# Patient Record
Sex: FEMALE | Race: WHITE | Employment: UNEMPLOYED
[De-identification: names, ages, dates, MRNs, and addresses within clinical notes are randomized per-mention and may not be internally consistent; named-entity substitution may affect disease eponyms.]

---

## 2017-01-17 ENCOUNTER — PROCEDURE VISIT (OUTPATIENT)
Dept: PODIATRY | Facility: CLINIC | Age: 82
End: 2017-01-17

## 2017-01-17 VITALS
DIASTOLIC BLOOD PRESSURE: 54 MMHG | HEART RATE: 63 BPM | HEIGHT: 60 IN | SYSTOLIC BLOOD PRESSURE: 96 MMHG | BODY MASS INDEX: 34.75 KG/M2 | WEIGHT: 177 LBS

## 2017-01-17 DIAGNOSIS — M79.674 PAIN IN TOES OF BOTH FEET: ICD-10-CM

## 2017-01-17 DIAGNOSIS — B35.1 DERMATOPHYTOSIS OF NAIL: Primary | ICD-10-CM

## 2017-01-17 DIAGNOSIS — M79.675 PAIN IN TOES OF BOTH FEET: ICD-10-CM

## 2017-01-17 PROCEDURE — 1036F TOBACCO NON-USER: CPT | Performed by: PODIATRIST

## 2017-01-17 PROCEDURE — 11721 DEBRIDE NAIL 6 OR MORE: CPT | Performed by: PODIATRIST

## 2017-01-17 RX ORDER — TAMSULOSIN HYDROCHLORIDE 0.4 MG/1
0.4 CAPSULE ORAL
COMMUNITY

## 2017-01-17 ASSESSMENT — ENCOUNTER SYMPTOMS
COLOR CHANGE: 0
DIARRHEA: 0
VOMITING: 0
NAUSEA: 0
CONSTIPATION: 0

## 2017-02-07 ENCOUNTER — HOSPITAL ENCOUNTER (OUTPATIENT)
Dept: PHYSICAL THERAPY | Age: 82
Setting detail: THERAPIES SERIES
Discharge: HOME | End: 2017-02-07
Payer: MEDICARE

## 2017-02-07 PROCEDURE — 97113 AQUATIC THERAPY/EXERCISES: CPT

## 2017-02-10 ENCOUNTER — HOSPITAL ENCOUNTER (OUTPATIENT)
Dept: PHYSICAL THERAPY | Age: 82
Setting detail: THERAPIES SERIES
Discharge: HOME | End: 2017-02-10
Payer: MEDICARE

## 2017-02-10 PROCEDURE — 97113 AQUATIC THERAPY/EXERCISES: CPT

## 2017-02-10 ASSESSMENT — PAIN DESCRIPTION - ORIENTATION: ORIENTATION: LOWER

## 2017-02-10 ASSESSMENT — PAIN DESCRIPTION - PAIN TYPE: TYPE: CHRONIC PAIN

## 2017-02-10 ASSESSMENT — PAIN DESCRIPTION - LOCATION: LOCATION: BACK

## 2017-02-10 ASSESSMENT — PAIN SCALES - GENERAL: PAINLEVEL_OUTOF10: 4

## 2017-02-14 ENCOUNTER — HOSPITAL ENCOUNTER (OUTPATIENT)
Dept: PHYSICAL THERAPY | Age: 82
Setting detail: THERAPIES SERIES
Discharge: HOME | End: 2017-02-14
Payer: MEDICARE

## 2017-02-14 PROCEDURE — G8978 MOBILITY CURRENT STATUS: HCPCS

## 2017-02-14 PROCEDURE — G8979 MOBILITY GOAL STATUS: HCPCS

## 2017-02-14 PROCEDURE — 97113 AQUATIC THERAPY/EXERCISES: CPT

## 2017-02-14 PROCEDURE — 97164 PT RE-EVAL EST PLAN CARE: CPT

## 2017-02-14 ASSESSMENT — PAIN SCALES - GENERAL
PAINLEVEL_OUTOF10: 3
PAINLEVEL_OUTOF10: 3

## 2017-02-14 ASSESSMENT — PAIN DESCRIPTION - ORIENTATION
ORIENTATION: LOWER
ORIENTATION: LOWER

## 2017-02-14 ASSESSMENT — PAIN DESCRIPTION - PAIN TYPE
TYPE: CHRONIC PAIN
TYPE: CHRONIC PAIN

## 2017-02-14 ASSESSMENT — PAIN DESCRIPTION - LOCATION
LOCATION: BACK
LOCATION: BACK

## 2017-02-16 ENCOUNTER — HOSPITAL ENCOUNTER (OUTPATIENT)
Dept: PHYSICAL THERAPY | Age: 82
Setting detail: THERAPIES SERIES
Discharge: HOME | End: 2017-02-16
Payer: MEDICARE

## 2017-02-16 PROCEDURE — 97113 AQUATIC THERAPY/EXERCISES: CPT

## 2017-02-16 ASSESSMENT — PAIN SCALES - GENERAL: PAINLEVEL_OUTOF10: 1

## 2017-02-16 ASSESSMENT — PAIN DESCRIPTION - ORIENTATION: ORIENTATION: LOWER

## 2017-02-16 ASSESSMENT — PAIN DESCRIPTION - LOCATION: LOCATION: BACK

## 2017-02-16 ASSESSMENT — PAIN DESCRIPTION - PAIN TYPE: TYPE: CHRONIC PAIN

## 2017-02-21 ENCOUNTER — HOSPITAL ENCOUNTER (OUTPATIENT)
Dept: PHYSICAL THERAPY | Age: 82
Setting detail: THERAPIES SERIES
Discharge: HOME | End: 2017-02-21
Payer: MEDICARE

## 2017-02-21 PROCEDURE — 97113 AQUATIC THERAPY/EXERCISES: CPT

## 2017-02-21 ASSESSMENT — PAIN DESCRIPTION - LOCATION: LOCATION: BACK

## 2017-02-21 ASSESSMENT — PAIN DESCRIPTION - ORIENTATION: ORIENTATION: LOWER;OTHER (COMMENT)

## 2017-02-21 ASSESSMENT — PAIN SCALES - GENERAL: PAINLEVEL_OUTOF10: 2

## 2017-02-21 ASSESSMENT — PAIN DESCRIPTION - PAIN TYPE: TYPE: CHRONIC PAIN

## 2017-02-23 ENCOUNTER — APPOINTMENT (OUTPATIENT)
Dept: PHYSICAL THERAPY | Age: 82
End: 2017-02-23
Payer: MEDICARE

## 2017-02-28 ENCOUNTER — HOSPITAL ENCOUNTER (OUTPATIENT)
Dept: PHYSICAL THERAPY | Age: 82
Setting detail: THERAPIES SERIES
Discharge: HOME | End: 2017-02-28
Payer: MEDICARE

## 2017-02-28 PROCEDURE — 97113 AQUATIC THERAPY/EXERCISES: CPT

## 2017-02-28 ASSESSMENT — PAIN DESCRIPTION - PAIN TYPE: TYPE: CHRONIC PAIN

## 2017-02-28 ASSESSMENT — PAIN SCALES - GENERAL: PAINLEVEL_OUTOF10: 3

## 2017-02-28 ASSESSMENT — PAIN DESCRIPTION - ORIENTATION: ORIENTATION: LOWER

## 2017-02-28 ASSESSMENT — PAIN DESCRIPTION - LOCATION: LOCATION: BACK

## 2017-03-02 ENCOUNTER — HOSPITAL ENCOUNTER (OUTPATIENT)
Dept: PHYSICAL THERAPY | Age: 82
Setting detail: THERAPIES SERIES
Discharge: HOME | End: 2017-03-02
Payer: MEDICARE

## 2017-03-02 PROCEDURE — 97113 AQUATIC THERAPY/EXERCISES: CPT

## 2017-03-02 ASSESSMENT — PAIN SCALES - GENERAL: PAINLEVEL_OUTOF10: 1

## 2017-03-02 ASSESSMENT — PAIN DESCRIPTION - PAIN TYPE: TYPE: CHRONIC PAIN

## 2017-03-02 ASSESSMENT — PAIN DESCRIPTION - LOCATION: LOCATION: BACK

## 2017-03-02 ASSESSMENT — PAIN DESCRIPTION - ORIENTATION: ORIENTATION: LOWER

## 2017-03-07 ENCOUNTER — HOSPITAL ENCOUNTER (OUTPATIENT)
Dept: PHYSICAL THERAPY | Age: 82
Setting detail: THERAPIES SERIES
Discharge: HOME | End: 2017-03-07
Payer: MEDICARE

## 2017-03-07 PROCEDURE — G8978 MOBILITY CURRENT STATUS: HCPCS

## 2017-03-07 PROCEDURE — G8979 MOBILITY GOAL STATUS: HCPCS

## 2017-03-07 PROCEDURE — 97113 AQUATIC THERAPY/EXERCISES: CPT

## 2017-03-09 ENCOUNTER — HOSPITAL ENCOUNTER (OUTPATIENT)
Dept: PHYSICAL THERAPY | Age: 82
Setting detail: THERAPIES SERIES
Discharge: HOME | End: 2017-03-09
Payer: MEDICARE

## 2017-03-09 PROCEDURE — 97113 AQUATIC THERAPY/EXERCISES: CPT

## 2017-03-14 ENCOUNTER — HOSPITAL ENCOUNTER (OUTPATIENT)
Dept: PHYSICAL THERAPY | Age: 82
Setting detail: THERAPIES SERIES
Discharge: HOME | End: 2017-03-14
Payer: MEDICARE

## 2017-03-14 ENCOUNTER — APPOINTMENT (OUTPATIENT)
Dept: PHYSICAL THERAPY | Age: 82
End: 2017-03-14
Payer: MEDICARE

## 2017-03-14 PROCEDURE — 97110 THERAPEUTIC EXERCISES: CPT

## 2017-03-16 ENCOUNTER — HOSPITAL ENCOUNTER (OUTPATIENT)
Dept: PHYSICAL THERAPY | Age: 82
Setting detail: THERAPIES SERIES
Discharge: HOME | End: 2017-03-16
Payer: MEDICARE

## 2017-03-16 PROCEDURE — 97110 THERAPEUTIC EXERCISES: CPT

## 2017-03-22 ENCOUNTER — HOSPITAL ENCOUNTER (OUTPATIENT)
Dept: PHYSICAL THERAPY | Age: 82
Setting detail: THERAPIES SERIES
Discharge: HOME | End: 2017-03-22
Payer: MEDICARE

## 2017-03-22 PROCEDURE — 97110 THERAPEUTIC EXERCISES: CPT

## 2017-03-22 ASSESSMENT — PAIN SCALES - GENERAL: PAINLEVEL_OUTOF10: 5

## 2017-03-22 ASSESSMENT — PAIN DESCRIPTION - LOCATION: LOCATION: BACK

## 2017-03-22 ASSESSMENT — PAIN DESCRIPTION - ORIENTATION: ORIENTATION: LOWER

## 2017-03-22 ASSESSMENT — PAIN DESCRIPTION - FREQUENCY: FREQUENCY: INTERMITTENT

## 2017-03-22 ASSESSMENT — PAIN DESCRIPTION - DESCRIPTORS: DESCRIPTORS: ACHING;DULL

## 2017-04-03 ENCOUNTER — HOSPITAL ENCOUNTER (OUTPATIENT)
Dept: PHYSICAL THERAPY | Age: 82
Setting detail: THERAPIES SERIES
Discharge: HOME | End: 2017-04-03
Payer: MEDICARE

## 2017-04-03 PROCEDURE — 97110 THERAPEUTIC EXERCISES: CPT

## 2017-04-03 ASSESSMENT — PAIN DESCRIPTION - FREQUENCY: FREQUENCY: INTERMITTENT

## 2017-04-03 ASSESSMENT — PAIN SCALES - GENERAL: PAINLEVEL_OUTOF10: 5

## 2017-04-03 ASSESSMENT — PAIN DESCRIPTION - LOCATION: LOCATION: BACK

## 2017-04-03 ASSESSMENT — PAIN DESCRIPTION - ORIENTATION: ORIENTATION: LOWER

## 2017-04-03 ASSESSMENT — PAIN DESCRIPTION - DESCRIPTORS: DESCRIPTORS: ACHING;DULL

## 2017-04-10 ENCOUNTER — HOSPITAL ENCOUNTER (OUTPATIENT)
Dept: PHYSICAL THERAPY | Age: 82
Setting detail: THERAPIES SERIES
Discharge: HOME | End: 2017-04-10
Payer: MEDICARE

## 2017-04-10 PROCEDURE — 97110 THERAPEUTIC EXERCISES: CPT

## 2017-04-10 ASSESSMENT — PAIN DESCRIPTION - LOCATION: LOCATION: BACK

## 2017-04-10 ASSESSMENT — PAIN DESCRIPTION - DESCRIPTORS: DESCRIPTORS: ACHING;DULL

## 2017-04-10 ASSESSMENT — PAIN SCALES - GENERAL: PAINLEVEL_OUTOF10: 3

## 2017-04-10 ASSESSMENT — PAIN DESCRIPTION - ORIENTATION: ORIENTATION: LOWER

## 2017-04-10 ASSESSMENT — PAIN DESCRIPTION - FREQUENCY: FREQUENCY: INTERMITTENT

## 2017-04-11 ENCOUNTER — PROCEDURE VISIT (OUTPATIENT)
Dept: PODIATRY | Age: 82
End: 2017-04-11
Payer: MEDICARE

## 2017-04-11 VITALS
HEART RATE: 66 BPM | DIASTOLIC BLOOD PRESSURE: 77 MMHG | BODY MASS INDEX: 34.75 KG/M2 | HEIGHT: 60 IN | WEIGHT: 177 LBS | SYSTOLIC BLOOD PRESSURE: 126 MMHG

## 2017-04-11 DIAGNOSIS — B35.1 DERMATOPHYTOSIS OF NAIL: Primary | ICD-10-CM

## 2017-04-11 DIAGNOSIS — M79.674 PAIN IN TOES OF BOTH FEET: ICD-10-CM

## 2017-04-11 DIAGNOSIS — M79.675 PAIN IN TOES OF BOTH FEET: ICD-10-CM

## 2017-04-11 PROCEDURE — 1036F TOBACCO NON-USER: CPT | Performed by: PODIATRIST

## 2017-04-11 PROCEDURE — 11721 DEBRIDE NAIL 6 OR MORE: CPT | Performed by: PODIATRIST

## 2017-04-11 ASSESSMENT — ENCOUNTER SYMPTOMS
CONSTIPATION: 0
NAUSEA: 0
VOMITING: 0
COLOR CHANGE: 0
DIARRHEA: 0

## 2017-04-17 ENCOUNTER — HOSPITAL ENCOUNTER (OUTPATIENT)
Dept: PHYSICAL THERAPY | Age: 82
Setting detail: THERAPIES SERIES
Discharge: HOME | End: 2017-04-17
Payer: MEDICARE

## 2017-04-17 PROCEDURE — 97110 THERAPEUTIC EXERCISES: CPT

## 2017-04-17 ASSESSMENT — PAIN SCALES - GENERAL: PAINLEVEL_OUTOF10: 5

## 2017-04-17 ASSESSMENT — PAIN DESCRIPTION - LOCATION: LOCATION: BACK

## 2017-04-17 ASSESSMENT — PAIN DESCRIPTION - DESCRIPTORS: DESCRIPTORS: ACHING;DULL

## 2017-04-17 ASSESSMENT — PAIN DESCRIPTION - FREQUENCY: FREQUENCY: CONTINUOUS

## 2017-04-17 ASSESSMENT — PAIN DESCRIPTION - ORIENTATION: ORIENTATION: LOWER

## 2017-04-20 ENCOUNTER — HOSPITAL ENCOUNTER (OUTPATIENT)
Dept: PHYSICAL THERAPY | Age: 82
Setting detail: THERAPIES SERIES
Discharge: HOME | End: 2017-04-20
Payer: MEDICARE

## 2017-05-01 ENCOUNTER — HOSPITAL ENCOUNTER (OUTPATIENT)
Dept: PHYSICAL THERAPY | Age: 82
Setting detail: THERAPIES SERIES
Discharge: HOME | End: 2017-05-01
Payer: MEDICARE

## 2017-05-01 PROCEDURE — G8979 MOBILITY GOAL STATUS: HCPCS

## 2017-05-01 PROCEDURE — G8978 MOBILITY CURRENT STATUS: HCPCS

## 2017-05-01 PROCEDURE — 97110 THERAPEUTIC EXERCISES: CPT

## 2017-05-01 ASSESSMENT — PAIN DESCRIPTION - ORIENTATION: ORIENTATION: RIGHT

## 2017-05-01 ASSESSMENT — PAIN SCALES - GENERAL: PAINLEVEL_OUTOF10: 5

## 2017-05-01 ASSESSMENT — PAIN DESCRIPTION - FREQUENCY: FREQUENCY: INTERMITTENT

## 2017-05-01 ASSESSMENT — PAIN DESCRIPTION - LOCATION: LOCATION: LEG

## 2017-05-01 ASSESSMENT — PAIN DESCRIPTION - DESCRIPTORS: DESCRIPTORS: CRAMPING

## 2017-05-09 ENCOUNTER — HOSPITAL ENCOUNTER (OUTPATIENT)
Dept: PHYSICAL THERAPY | Age: 82
Setting detail: THERAPIES SERIES
Discharge: HOME | End: 2017-05-09
Payer: MEDICARE

## 2017-05-16 ENCOUNTER — APPOINTMENT (OUTPATIENT)
Dept: PHYSICAL THERAPY | Age: 82
End: 2017-05-16
Payer: MEDICARE

## 2017-05-23 ENCOUNTER — APPOINTMENT (OUTPATIENT)
Dept: PHYSICAL THERAPY | Age: 82
End: 2017-05-23
Payer: MEDICARE

## 2017-06-20 ENCOUNTER — PROCEDURE VISIT (OUTPATIENT)
Dept: PODIATRY | Age: 82
End: 2017-06-20
Payer: MEDICARE

## 2017-06-20 VITALS — HEIGHT: 60 IN | BODY MASS INDEX: 35.93 KG/M2 | WEIGHT: 183 LBS

## 2017-06-20 DIAGNOSIS — B35.1 DERMATOPHYTOSIS OF NAIL: Primary | ICD-10-CM

## 2017-06-20 DIAGNOSIS — M79.674 PAIN IN TOES OF BOTH FEET: ICD-10-CM

## 2017-06-20 DIAGNOSIS — M79.675 PAIN IN TOES OF BOTH FEET: ICD-10-CM

## 2017-06-20 PROCEDURE — 11721 DEBRIDE NAIL 6 OR MORE: CPT | Performed by: PODIATRIST

## 2017-06-20 PROCEDURE — 1036F TOBACCO NON-USER: CPT | Performed by: PODIATRIST

## 2017-06-20 RX ORDER — MAGNESIUM GLYCINATE 100 MG
TABLET ORAL
COMMUNITY

## 2017-06-20 ASSESSMENT — ENCOUNTER SYMPTOMS
NAUSEA: 0
VOMITING: 0
CONSTIPATION: 0
COLOR CHANGE: 0
DIARRHEA: 0

## 2017-08-23 ENCOUNTER — HOSPITAL ENCOUNTER (OUTPATIENT)
Dept: PAIN MANAGEMENT | Age: 82
Discharge: HOME | End: 2017-08-23
Payer: MEDICARE

## 2017-08-29 ENCOUNTER — PROCEDURE VISIT (OUTPATIENT)
Dept: PODIATRY | Age: 82
End: 2017-08-29
Payer: MEDICARE

## 2017-08-29 VITALS
DIASTOLIC BLOOD PRESSURE: 86 MMHG | WEIGHT: 181 LBS | HEART RATE: 71 BPM | HEIGHT: 60 IN | BODY MASS INDEX: 35.53 KG/M2 | SYSTOLIC BLOOD PRESSURE: 152 MMHG

## 2017-08-29 DIAGNOSIS — B35.1 DERMATOPHYTOSIS OF NAIL: Primary | ICD-10-CM

## 2017-08-29 DIAGNOSIS — M79.674 PAIN IN TOES OF BOTH FEET: ICD-10-CM

## 2017-08-29 DIAGNOSIS — M79.675 PAIN IN TOES OF BOTH FEET: ICD-10-CM

## 2017-08-29 PROCEDURE — 11721 DEBRIDE NAIL 6 OR MORE: CPT | Performed by: PODIATRIST

## 2017-08-29 PROCEDURE — 1036F TOBACCO NON-USER: CPT | Performed by: PODIATRIST

## 2017-08-29 ASSESSMENT — ENCOUNTER SYMPTOMS
CONSTIPATION: 0
NAUSEA: 0
COLOR CHANGE: 0
VOMITING: 0
DIARRHEA: 0

## 2017-09-11 ENCOUNTER — HOSPITAL ENCOUNTER (OUTPATIENT)
Dept: PAIN MANAGEMENT | Age: 82
Discharge: HOME | End: 2017-09-11
Payer: MEDICARE

## 2017-09-11 VITALS
HEIGHT: 60 IN | DIASTOLIC BLOOD PRESSURE: 85 MMHG | TEMPERATURE: 97.6 F | BODY MASS INDEX: 35.53 KG/M2 | SYSTOLIC BLOOD PRESSURE: 171 MMHG | RESPIRATION RATE: 20 BRPM | OXYGEN SATURATION: 98 % | HEART RATE: 76 BPM | WEIGHT: 181 LBS

## 2017-09-11 DIAGNOSIS — M16.11 PRIMARY OSTEOARTHRITIS OF RIGHT HIP: Primary | ICD-10-CM

## 2017-09-11 DIAGNOSIS — M70.61 TROCHANTERIC BURSITIS OF RIGHT HIP: ICD-10-CM

## 2017-09-11 DIAGNOSIS — M47.816 OSTEOARTHRITIS OF LUMBAR SPINE, UNSPECIFIED SPINAL OSTEOARTHRITIS COMPLICATION STATUS: ICD-10-CM

## 2017-09-11 DIAGNOSIS — M54.16 LUMBAR RADICULOPATHY: ICD-10-CM

## 2017-09-11 DIAGNOSIS — M51.36 DDD (DEGENERATIVE DISC DISEASE), LUMBAR: ICD-10-CM

## 2017-09-11 PROCEDURE — 99204 OFFICE O/P NEW MOD 45 MIN: CPT | Performed by: PAIN MEDICINE

## 2017-09-11 PROCEDURE — 99203 OFFICE O/P NEW LOW 30 MIN: CPT

## 2017-09-11 ASSESSMENT — ENCOUNTER SYMPTOMS
DIARRHEA: 0
BACK PAIN: 1
VOMITING: 0
BLURRED VISION: 0
PHOTOPHOBIA: 0
HEARTBURN: 0
NAUSEA: 0
COUGH: 0
HEMOPTYSIS: 0

## 2017-09-11 ASSESSMENT — PAIN DESCRIPTION - LOCATION: LOCATION: BACK;GROIN

## 2017-09-11 ASSESSMENT — PAIN DESCRIPTION - FREQUENCY: FREQUENCY: CONTINUOUS

## 2017-09-11 ASSESSMENT — PAIN DESCRIPTION - ONSET: ONSET: ON-GOING

## 2017-09-11 ASSESSMENT — PAIN SCALES - GENERAL: PAINLEVEL_OUTOF10: 2

## 2017-09-11 ASSESSMENT — PAIN DESCRIPTION - PAIN TYPE: TYPE: CHRONIC PAIN

## 2017-11-07 ENCOUNTER — PROCEDURE VISIT (OUTPATIENT)
Dept: PODIATRY | Age: 82
End: 2017-11-07
Payer: MEDICARE

## 2017-11-07 VITALS — HEIGHT: 59 IN | WEIGHT: 179 LBS | BODY MASS INDEX: 36.08 KG/M2

## 2017-11-07 DIAGNOSIS — M79.674 PAIN IN TOES OF BOTH FEET: ICD-10-CM

## 2017-11-07 DIAGNOSIS — B35.1 DERMATOPHYTOSIS OF NAIL: Primary | ICD-10-CM

## 2017-11-07 DIAGNOSIS — M79.675 PAIN IN TOES OF BOTH FEET: ICD-10-CM

## 2017-11-07 PROCEDURE — 11721 DEBRIDE NAIL 6 OR MORE: CPT | Performed by: PODIATRIST

## 2017-11-07 RX ORDER — LETROZOLE 2.5 MG/1
2.5 TABLET, FILM COATED ORAL
COMMUNITY

## 2017-11-07 RX ORDER — CLOTRIMAZOLE AND BETAMETHASONE DIPROPIONATE 10; .64 MG/G; MG/G
CREAM TOPICAL
Qty: 45 G | Refills: 3 | Status: SHIPPED | OUTPATIENT
Start: 2017-11-07

## 2017-11-07 RX ORDER — HYDROCODONE BITARTRATE AND ACETAMINOPHEN 5; 325 MG/1; MG/1
1-2 TABLET ORAL
COMMUNITY
Start: 2017-10-31

## 2017-11-07 ASSESSMENT — ENCOUNTER SYMPTOMS
VOMITING: 0
DIARRHEA: 0
CONSTIPATION: 0
NAUSEA: 0
COLOR CHANGE: 0

## 2017-11-07 NOTE — PROGRESS NOTES
mg by mouth daily      Horse [place] 300 MG CAPS Take 300 mg by mouth daily      Carnosine L POWD Take 500 mg by mouth daily Now L Carnosine      Borage, Borago officinalis, (BORAGE OIL PO) Take 1,000 mg by mouth every other day GLA Borage oil      metFORMIN (GLUCOPHAGE) 1000 MG tablet Take 1,000 mg by mouth 2 times daily      carvedilol (COREG) 12.5 MG tablet Take 12.5 mg by mouth 2 times daily Pt takes 1/2 pill twice a day. 6.25mg twice a day      Glutamine 500 MG CAPS Take 500 mg by mouth 2 times daily      Benfotiamine 150 MG CAPS Take 150 mg by mouth daily Doctor's Best Benfotiamine 150 mg daily      levothyroxine (SYNTHROID) 75 MCG tablet Take 75 mcg by mouth Daily      NONFORMULARY   Take 1 tablet by mouth every other day iodoral every other day      Alpha-Lipoic Acid (LIPOIC ACID PO)   Take 300 mg by mouth nightly       Lysine 1000 MG TABS Take by mouth Lysine 1000mg and proline 500 mg daily      VITAMIN D, CHOLECALCIFEROL, PO Take 6,000 Int'l Units by mouth daily      spironolactone (ALDACTONE) 25 MG tablet Take 25 mg by mouth daily      Omega-3 Fatty Acids (FISH OIL CONCENTRATE PO) Take by mouth daily Fish oil 1 teaspoon daily      cloNIDine (CATAPRES) 0.1 MG tablet   Take 0.1 mg by mouth daily       GLUTATHIONE PO Take 25 mg by mouth daily NOW brand glutathione      Astaxanthin 4 MG CAPS Take 4 mg by mouth daily NOW brand      Arginine 500 MG CAPS Take 500 mg by mouth daily Now brand L-arginine      GLYCINE PO Take 1,000 mg by mouth 2 times daily      RED YEAST RICE EXTRACT PO Take 1,200 mg by mouth 2 times daily      vitamin E 400 UNIT capsule Take 400 Units by mouth daily Mixed tocopherols      vitamin A 76309 UNITS capsule Take 10,000 Units by mouth daily      magnesium citrate solution Take 296 mLs by mouth as needed for Constipation       No current facility-administered medications on file prior to visit.       Review of Systems   Constitutional: Negative for chills,

## 2018-01-16 ENCOUNTER — PROCEDURE VISIT (OUTPATIENT)
Dept: PODIATRY | Age: 83
End: 2018-01-16
Payer: MEDICARE

## 2018-01-16 VITALS — BODY MASS INDEX: 41.66 KG/M2 | WEIGHT: 180 LBS | HEIGHT: 55 IN

## 2018-01-16 DIAGNOSIS — B35.1 DERMATOPHYTOSIS OF NAIL: Primary | ICD-10-CM

## 2018-01-16 DIAGNOSIS — M79.674 PAIN IN TOES OF BOTH FEET: ICD-10-CM

## 2018-01-16 DIAGNOSIS — M79.675 PAIN IN TOES OF BOTH FEET: ICD-10-CM

## 2018-01-16 PROCEDURE — 11721 DEBRIDE NAIL 6 OR MORE: CPT | Performed by: PODIATRIST

## 2018-01-16 ASSESSMENT — ENCOUNTER SYMPTOMS
NAUSEA: 0
COLOR CHANGE: 0
VOMITING: 0
CONSTIPATION: 0
DIARRHEA: 0

## 2018-01-16 NOTE — PROGRESS NOTES
[facility]  Return Patient    Chief Complaint   Patient presents with    Nail Problem     B/L nail trim    Other     PCP: Last visit 10/10/2017 Dr Laly Hawkins       Subjective: This Baring Gist comes to clinic for foot and nail care. Pt currently has complaint of thickened, painful, elongated nails that he/she cannot manage by themselves. Pt. Relates pain to nails with shoe gear. Pt's primary care physician is DARSHAN Phillips DO. Past Medical History:   Diagnosis Date    Cancer of left breast ([address]     Chronic back pain     Hyperlipidemia     Hypertension     takes supplement     Hypothyroid     Type II or unspecified type diabetes mellitus without mention of complication, not stated as uncontrolled     Wears glasses     reading    Wears partial dentures     upper right tooth       Allergies   Allergen Reactions    Dust Mite Extract     Molds & Smuts     Pollen Extract     Tetanus Toxoid Other (See Comments)     Current Outpatient Prescriptions on File Prior to Visit   Medication Sig Dispense Refill    TURMERIC PO Take 1,500 mg by mouth      RESVERATROL PO Take 1,200 mg by mouth      OXYGEN 2 L/min      letrozole (FEMARA) 2.5 MG tablet Take 2.5 mg by mouth      HYDROcodone-acetaminophen (NORCO) 5-325 MG per tablet Take 1-2 tablets by mouth .  clotrimazole-betamethasone (LOTRISONE) 1-0.05 % cream Apply topically 2 times daily.  45 g 3    Magnesium Bisglycinate (MAG GLYCINATE) 100 MG TABS Take by mouth      tamsulosin (FLOMAX) 0.4 MG capsule Take 0.4 mg by mouth      QUINAPRIL HCL PO Take by mouth      Histidine HCl IZZY 1 tablet by Does not apply route daily      VITAMIN K PO Take 1 tablet by mouth daily Life extension super vitamin K      Biotin 5000 MCG CAPS Take 1 tablet by mouth daily      Acetylcarnitine HCl 500 MG CAPS Take 1 tablet by mouth daily      NATTOKINASE PO Take by mouth daily Doctor's Best nattokinase 2,000 FU daily      Probiotic Product (PROBIOTIC DAILY PO) Take 1 tablet by mouth daily NOW probiotic      NONFORMULARY Take 200 mg by mouth daily Ubiquinol      Ascorbic Acid (VITAMIN C) 500 MG tablet Take 1,000 mg by mouth 3 times daily Now Vitamin C with Gardenia Hips      Barberry-Oreg Grape-Goldenseal (BERBERINE COMPLEX PO) Take 500 mg by mouth daily      Horse [place] 300 MG CAPS Take 300 mg by mouth daily      Carnosine L POWD Take 500 mg by mouth daily Now L Carnosine      Borage, Borago officinalis, (BORAGE OIL PO) Take 1,000 mg by mouth every other day GLA Borage oil      metFORMIN (GLUCOPHAGE) 1000 MG tablet Take 1,000 mg by mouth 2 times daily      carvedilol (COREG) 12.5 MG tablet Take 12.5 mg by mouth 2 times daily Pt takes 1/2 pill twice a day.  6.25mg twice a day      Glutamine 500 MG CAPS Take 500 mg by mouth 2 times daily      Benfotiamine 150 MG CAPS Take 150 mg by mouth daily Doctor's Best Benfotiamine 150 mg daily      levothyroxine (SYNTHROID) 75 MCG tablet Take 75 mcg by mouth Daily      NONFORMULARY   Take 1 tablet by mouth every other day iodoral every other day      Alpha-Lipoic Acid (LIPOIC ACID PO)   Take 300 mg by mouth nightly       Lysine 1000 MG TABS Take by mouth Lysine 1000mg and proline 500 mg daily      VITAMIN D, CHOLECALCIFEROL, PO Take 6,000 Int'l Units by mouth daily      spironolactone (ALDACTONE) 25 MG tablet Take 25 mg by mouth daily      Omega-3 Fatty Acids (FISH OIL CONCENTRATE PO) Take by mouth daily Fish oil 1 teaspoon daily      cloNIDine (CATAPRES) 0.1 MG tablet   Take 0.1 mg by mouth daily       GLUTATHIONE PO Take 25 mg by mouth daily NOW brand glutathione      Astaxanthin 4 MG CAPS Take 4 mg by mouth daily NOW brand      Arginine 500 MG CAPS Take 500 mg by mouth daily Now brand L-arginine      GLYCINE PO Take 1,000 mg by mouth 2 times daily      RED YEAST RICE EXTRACT PO Take 1,200 mg by mouth 2 times daily      vitamin E 400 UNIT capsule Take 400 Units by mouth daily Mixed both feet       Orders Placed This Encounter   Procedures    CO DEBRIDEMENT OF NAILS, 6 OR MORE         Plan:   Pt was evaluated and examined. Patient was given personalized discharge instructions. Nails 1-10 were debrided in length and thickness sharply with a nail nipper and  without incident. Pt will follow up in 9 weeks or sooner if any problems arise. Diagnosis was discussed with the pt and all of their questions were answered in detail. Proper foot hygiene and care was discussed with the pt. Patient to check feet daily and contact the office with any questions/problems/concerns. Other comorbidity noted and will be managed by PCP. Pain waiver discussed with patient and confirmed.        Electronically signed by Padmaja Mascorro DPM on 1/16/2018 at 11:26 AM

## 2018-04-17 ENCOUNTER — OFFICE VISIT (OUTPATIENT)
Dept: PODIATRY | Age: 83
End: 2018-04-17
Payer: MEDICARE

## 2018-04-17 VITALS
WEIGHT: 189 LBS | DIASTOLIC BLOOD PRESSURE: 40 MMHG | SYSTOLIC BLOOD PRESSURE: 100 MMHG | BODY MASS INDEX: 37.11 KG/M2 | HEIGHT: 60 IN

## 2018-04-17 DIAGNOSIS — B35.1 DERMATOPHYTOSIS OF NAIL: Primary | ICD-10-CM

## 2018-04-17 DIAGNOSIS — M79.674 PAIN IN TOES OF BOTH FEET: ICD-10-CM

## 2018-04-17 DIAGNOSIS — M79.675 PAIN IN TOES OF BOTH FEET: ICD-10-CM

## 2018-04-17 PROCEDURE — 11721 DEBRIDE NAIL 6 OR MORE: CPT | Performed by: PODIATRIST

## 2018-04-17 ASSESSMENT — ENCOUNTER SYMPTOMS
COLOR CHANGE: 0
VOMITING: 0
NAUSEA: 0
CONSTIPATION: 0
DIARRHEA: 0

## 2018-07-03 ENCOUNTER — OFFICE VISIT (OUTPATIENT)
Dept: PODIATRY | Age: 83
End: 2018-07-03
Payer: MEDICARE

## 2018-07-03 VITALS
BODY MASS INDEX: 35.34 KG/M2 | HEART RATE: 66 BPM | HEIGHT: 60 IN | WEIGHT: 180 LBS | DIASTOLIC BLOOD PRESSURE: 64 MMHG | SYSTOLIC BLOOD PRESSURE: 126 MMHG

## 2018-07-03 DIAGNOSIS — B35.1 DERMATOPHYTOSIS OF NAIL: Primary | ICD-10-CM

## 2018-07-03 DIAGNOSIS — M79.674 PAIN IN TOES OF BOTH FEET: ICD-10-CM

## 2018-07-03 DIAGNOSIS — M79.675 PAIN IN TOES OF BOTH FEET: ICD-10-CM

## 2018-07-03 PROCEDURE — 11721 DEBRIDE NAIL 6 OR MORE: CPT | Performed by: PODIATRIST

## 2018-07-03 RX ORDER — ASPIRIN/CALCIUM/MAG/ALUMINUM 325 MG
TABLET ORAL
COMMUNITY

## 2018-07-03 ASSESSMENT — ENCOUNTER SYMPTOMS
COLOR CHANGE: 0
CONSTIPATION: 0
NAUSEA: 0
DIARRHEA: 0
VOMITING: 0

## 2018-07-03 NOTE — PROGRESS NOTES
[facility]  Return Patient    Chief Complaint   Patient presents with    Nail Problem     nail trim/last saw Shaheen Gaytan 10/10/17       Subjective: This Melanie Oswald comes to clinic for foot and nail care. Pt currently has complaint of thickened, painful, elongated nails that he/she cannot manage by themselves. Pt. Relates pain to nails with shoe gear. Pt's primary care physician is Clarissa Gonzalez DO. Past Medical History:   Diagnosis Date    Cancer of left breast ([address]     Chronic back pain     Hyperlipidemia     Hypertension     takes supplement     Hypothyroid     Type II or unspecified type diabetes mellitus without mention of complication, not stated as uncontrolled     Wears glasses     reading    Wears partial dentures     upper right tooth       Allergies   Allergen Reactions    Dust Mite Extract     Molds & Smuts     Pollen Extract     Tetanus Toxoid Other (See Comments)     Current Outpatient Prescriptions on File Prior to Visit   Medication Sig Dispense Refill    TURMERIC PO Take 1,500 mg by mouth      RESVERATROL PO Take 1,200 mg by mouth      OXYGEN 2 L/min      letrozole (FEMARA) 2.5 MG tablet Take 2.5 mg by mouth      HYDROcodone-acetaminophen (NORCO) 5-325 MG per tablet Take 1-2 tablets by mouth .  clotrimazole-betamethasone (LOTRISONE) 1-0.05 % cream Apply topically 2 times daily.  45 g 3    Magnesium Bisglycinate (MAG GLYCINATE) 100 MG TABS Take by mouth      tamsulosin (FLOMAX) 0.4 MG capsule Take 0.4 mg by mouth      QUINAPRIL HCL PO Take by mouth      Histidine HCl IZZY 1 tablet by Does not apply route daily      VITAMIN K PO Take 1 tablet by mouth daily Life extension super vitamin K      Biotin 5000 MCG CAPS Take 1 tablet by mouth daily      Acetylcarnitine HCl 500 MG CAPS Take 1 tablet by mouth daily      NATTOKINASE PO Take by mouth daily Doctor's Best nattokinase 2,000 FU daily      Probiotic Product (PROBIOTIC DAILY PO) Take 1 tablet by mouth daily NOW probiotic      NONFORMULARY Take 200 mg by mouth daily Ubiquinol      Ascorbic Acid (VITAMIN C) 500 MG tablet Take 1,000 mg by mouth 3 times daily Now Vitamin C with Gardenia Hips      Barberry-Oreg Grape-Goldenseal (BERBERINE COMPLEX PO) Take 500 mg by mouth daily      Horse [place] 300 MG CAPS Take 300 mg by mouth daily      Carnosine L POWD Take 500 mg by mouth daily Now L Carnosine      Borage, Borago officinalis, (BORAGE OIL PO) Take 1,000 mg by mouth every other day GLA Borage oil      metFORMIN (GLUCOPHAGE) 1000 MG tablet Take 1,000 mg by mouth 2 times daily      carvedilol (COREG) 12.5 MG tablet Take 12.5 mg by mouth 2 times daily Pt takes 1/2 pill twice a day.  6.25mg twice a day      Glutamine 500 MG CAPS Take 500 mg by mouth 2 times daily      Benfotiamine 150 MG CAPS Take 150 mg by mouth daily Doctor's Best Benfotiamine 150 mg daily      levothyroxine (SYNTHROID) 75 MCG tablet Take 75 mcg by mouth Daily      NONFORMULARY   Take 1 tablet by mouth every other day iodoral every other day      Alpha-Lipoic Acid (LIPOIC ACID PO)   Take 300 mg by mouth nightly       Lysine 1000 MG TABS Take by mouth Lysine 1000mg and proline 500 mg daily      VITAMIN D, CHOLECALCIFEROL, PO Take 6,000 Int'l Units by mouth daily      spironolactone (ALDACTONE) 25 MG tablet Take 25 mg by mouth daily      Omega-3 Fatty Acids (FISH OIL CONCENTRATE PO) Take by mouth daily Fish oil 1 teaspoon daily      cloNIDine (CATAPRES) 0.1 MG tablet   Take 0.1 mg by mouth daily       GLUTATHIONE PO Take 25 mg by mouth daily NOW brand glutathione      Astaxanthin 4 MG CAPS Take 4 mg by mouth daily NOW brand      Arginine 500 MG CAPS Take 500 mg by mouth daily Now brand L-arginine      GLYCINE PO Take 1,000 mg by mouth 2 times daily      RED YEAST RICE EXTRACT PO Take 1,200 mg by mouth 2 times daily      vitamin E 400 UNIT capsule Take 400 Units by mouth daily Mixed tocopherols      vitamin A 94706 UNITS

## 2018-09-10 ENCOUNTER — OFFICE VISIT (OUTPATIENT)
Dept: PODIATRY | Age: 83
End: 2018-09-10
Payer: MEDICARE

## 2018-09-10 VITALS — HEIGHT: 60 IN | WEIGHT: 180 LBS | BODY MASS INDEX: 35.34 KG/M2

## 2018-09-10 DIAGNOSIS — M79.672 PAIN IN BOTH FEET: ICD-10-CM

## 2018-09-10 DIAGNOSIS — B35.1 DERMATOPHYTOSIS OF NAIL: ICD-10-CM

## 2018-09-10 DIAGNOSIS — I73.9 PERIPHERAL VASCULAR DISEASE, UNSPECIFIED (HCC): ICD-10-CM

## 2018-09-10 DIAGNOSIS — M79.671 PAIN IN BOTH FEET: ICD-10-CM

## 2018-09-10 DIAGNOSIS — E11.51 TYPE II DIABETES MELLITUS WITH PERIPHERAL CIRCULATORY DISORDER (HCC): Primary | ICD-10-CM

## 2018-09-10 PROBLEM — Z96.659 HISTORY OF TOTAL KNEE ARTHROPLASTY: Status: ACTIVE | Noted: 2017-09-13

## 2018-09-10 PROBLEM — I10 HYPERTENSION: Status: ACTIVE | Noted: 2017-09-13

## 2018-09-10 PROBLEM — E11.9 DIABETES (HCC): Status: ACTIVE | Noted: 2017-09-13

## 2018-09-10 PROBLEM — R26.9 NEUROLOGIC GAIT DISORDER: Status: ACTIVE | Noted: 2017-10-02

## 2018-09-10 PROBLEM — R92.1 BREAST CALCIFICATION, LEFT: Status: ACTIVE | Noted: 2018-04-10

## 2018-09-10 PROCEDURE — 99203 OFFICE O/P NEW LOW 30 MIN: CPT | Performed by: PODIATRIST

## 2018-09-10 PROCEDURE — 11721 DEBRIDE NAIL 6 OR MORE: CPT | Performed by: PODIATRIST

## 2018-09-10 PROCEDURE — 1123F ACP DISCUSS/DSCN MKR DOCD: CPT | Performed by: PODIATRIST

## 2018-09-10 PROCEDURE — G8400 PT W/DXA NO RESULTS DOC: HCPCS | Performed by: PODIATRIST

## 2018-09-10 PROCEDURE — 1101F PT FALLS ASSESS-DOCD LE1/YR: CPT | Performed by: PODIATRIST

## 2018-09-10 PROCEDURE — 1036F TOBACCO NON-USER: CPT | Performed by: PODIATRIST

## 2018-09-10 PROCEDURE — G8417 CALC BMI ABV UP PARAM F/U: HCPCS | Performed by: PODIATRIST

## 2018-09-10 PROCEDURE — G8427 DOCREV CUR MEDS BY ELIG CLIN: HCPCS | Performed by: PODIATRIST

## 2018-09-10 PROCEDURE — 1090F PRES/ABSN URINE INCON ASSESS: CPT | Performed by: PODIATRIST

## 2018-09-10 PROCEDURE — 4040F PNEUMOC VAC/ADMIN/RCVD: CPT | Performed by: PODIATRIST

## 2018-09-10 NOTE — PROGRESS NOTES
Acid (LIPOIC ACID PO)   Take 300 mg by mouth nightly       Lysine 1000 MG TABS Take by mouth Lysine 1000mg and proline 500 mg daily      VITAMIN D, CHOLECALCIFEROL, PO Take 6,000 Int'l Units by mouth daily      spironolactone (ALDACTONE) 25 MG tablet Take 25 mg by mouth daily      Omega-3 Fatty Acids (FISH OIL CONCENTRATE PO) Take by mouth daily Fish oil 1 teaspoon daily      cloNIDine (CATAPRES) 0.1 MG tablet   Take 0.1 mg by mouth daily       GLUTATHIONE PO Take 25 mg by mouth daily NOW brand glutathione      Astaxanthin 4 MG CAPS Take 4 mg by mouth daily NOW brand      Arginine 500 MG CAPS Take 500 mg by mouth daily Now brand L-arginine      GLYCINE PO Take 1,000 mg by mouth 2 times daily      RED YEAST RICE EXTRACT PO Take 1,200 mg by mouth 2 times daily      vitamin E 400 UNIT capsule Take 400 Units by mouth daily Mixed tocopherols      vitamin A 61942 UNITS capsule Take 10,000 Units by mouth daily      magnesium citrate solution Take 296 mLs by mouth as needed for Constipation       No current facility-administered medications on file prior to visit. Review of Systems  Review of Systems - History obtained from chart review and the patient  General ROS: negative for - chills, fatigue, fever, night sweats or weight gain  Constitutional: Negative for chills, diaphoresis, fatigue, fever and unexpected weight change. Musculoskeletal: Positive for arthralgias, gait problem and joint swelling. Neurological ROS: negative for - behavioral changes, confusion, headaches or seizures. Negative for weakness and numbness. Dermatological ROS: negative for - mole changes, rash  Cardiovascular: Negative for leg swelling. Gastrointestinal: Negative for constipation, diarrhea, nausea and vomiting. Objective:  General: AAO x 3 in NAD.     Derm  Toenail Description  Sites of Onychomycosis Involvement (Check affected area)  [x] [x] [x] [x] [x] [x] [x] [x] [x] [x]  5 4 3 2 1 1 2 3 4 5

## 2018-11-12 ENCOUNTER — OFFICE VISIT (OUTPATIENT)
Dept: PODIATRY | Age: 83
End: 2018-11-12
Payer: MEDICARE

## 2018-11-12 VITALS — BODY MASS INDEX: 35.34 KG/M2 | HEIGHT: 60 IN | WEIGHT: 180 LBS

## 2018-11-12 DIAGNOSIS — E11.51 TYPE II DIABETES MELLITUS WITH PERIPHERAL CIRCULATORY DISORDER (HCC): Primary | ICD-10-CM

## 2018-11-12 DIAGNOSIS — M79.671 PAIN IN BOTH FEET: ICD-10-CM

## 2018-11-12 DIAGNOSIS — M79.672 PAIN IN BOTH FEET: ICD-10-CM

## 2018-11-12 DIAGNOSIS — I73.9 PVD (PERIPHERAL VASCULAR DISEASE) (HCC): ICD-10-CM

## 2018-11-12 DIAGNOSIS — B35.1 DERMATOPHYTOSIS OF NAIL: ICD-10-CM

## 2018-11-12 PROCEDURE — 99213 OFFICE O/P EST LOW 20 MIN: CPT | Performed by: PODIATRIST

## 2018-11-12 PROCEDURE — 1101F PT FALLS ASSESS-DOCD LE1/YR: CPT | Performed by: PODIATRIST

## 2018-11-12 PROCEDURE — G8427 DOCREV CUR MEDS BY ELIG CLIN: HCPCS | Performed by: PODIATRIST

## 2018-11-12 PROCEDURE — 1123F ACP DISCUSS/DSCN MKR DOCD: CPT | Performed by: PODIATRIST

## 2018-11-12 PROCEDURE — 4040F PNEUMOC VAC/ADMIN/RCVD: CPT | Performed by: PODIATRIST

## 2018-11-12 PROCEDURE — G8417 CALC BMI ABV UP PARAM F/U: HCPCS | Performed by: PODIATRIST

## 2018-11-12 PROCEDURE — G8484 FLU IMMUNIZE NO ADMIN: HCPCS | Performed by: PODIATRIST

## 2018-11-12 PROCEDURE — G8400 PT W/DXA NO RESULTS DOC: HCPCS | Performed by: PODIATRIST

## 2018-11-12 PROCEDURE — 1090F PRES/ABSN URINE INCON ASSESS: CPT | Performed by: PODIATRIST

## 2018-11-12 PROCEDURE — 1036F TOBACCO NON-USER: CPT | Performed by: PODIATRIST

## 2018-11-12 PROCEDURE — 11721 DEBRIDE NAIL 6 OR MORE: CPT | Performed by: PODIATRIST

## 2018-11-12 NOTE — PROGRESS NOTES
[address] [facility] [id number] [facility]  [address]  Dept: [phone]    DIABETIC NAIL PROGRESS NOTE  Date of patient's visit: 11/12/2018  Patient's Name:  Sepideh Stallworth YOB: 1935            Patient Care Team:  Jena Winters DO as PCP - Francisca Horton MD as Consulting Physician (Hematology and Oncology)  Bj Scruggs MD as Surgeon (General Surgery)  Rosalino France MD as Physician (Radiation Oncology)          Chief Complaint   Patient presents with    Diabetes    Nail Problem     pcp is dr kevin dewey last seen july 12 2018    Circulatory Problem       Subjective:   Sepideh Stallworth comes to clinic for Diabetes; Nail Problem (pcp is dr kevin dewey last seen july 12 2018); and Circulatory Problem    she is a diabetic and states that she checks her feet daily. Pt currently has complaint of thickened, elongated nails that they cannot manage by themselves. Pt's primary care physician is Jena Winters DO last seen July 12 2018   Pt's last blood sugar was 141 . Relates to new foot pain on the bottom of the foot. No results found for: LABA1C   Complains of numbness in the feet bilat.   Past Medical History:   Diagnosis Date    Cancer of left breast ([address]     Chronic back pain     Hyperlipidemia     Hypertension     takes supplement     Hypothyroid     Type II or unspecified type diabetes mellitus without mention of complication, not stated as uncontrolled     Wears glasses     reading    Wears partial dentures     upper right tooth       Allergies   Allergen Reactions    Adhesive Tape      Takes skin off    Dust Mite Extract     Molds & Smuts     Pollen Extract     Tetanus Toxoid Other (See Comments)     Current Outpatient Prescriptions on File Prior to Visit   Medication Sig Dispense Refill    Aspirin Buf,[id number], (BUFFERED ASPIRIN) 325 MG TABS Take by mouth      TURMERIC PO Take 1,500 mg by mouth      Visual inspection:  Deformity: hammertoe deformity federico feet  amputation: absent  Skin lesions: absent  Edema: right- 2+ pitting edema, left- 2+ pitting edema    Sensory exam:  Monofilament sensation: abnormal - 6/10 via SW 5.07/10g monofilament to the plantar foot bilateral feet    Pulses: abnormal - 1/4 dorsalis pedis pulse and 1/4 Posterior tibial pulse,   Pinprick: Impaired  Proprioception: Impaired  Vibration (128 Hz): Impaired         DM with PVD       [x]Yes    []No      Assessment:  82 y.o. female with:   Diagnosis Orders   1. Type II diabetes mellitus with peripheral circulatory disorder (HCC)  GA DEBRIDEMENT OF NAILS, 6 OR MORE    Diabetic Foot Exam   2. Dermatophytosis of nail  GA DEBRIDEMENT OF NAILS, 6 OR MORE    Diabetic Foot Exam   3. PVD (peripheral vascular disease) (HCC)  GA DEBRIDEMENT OF NAILS, 6 OR MORE    Diabetic Foot Exam   4. Pain in both feet  GA DEBRIDEMENT OF NAILS, 6 OR MORE    Diabetic Foot Exam           Q7   []Yes    []No                Q8   [x]Yes    []No                     Q9   []Yes    []No    Plan:   Pt was evaluated and examined. Patient was given personalized discharge instructions. Pt to take OTC small dose ibuprofen and wear supportive shoes. NO barefoot walking. Nails 1-10 were debrided sharply in length and thickness with a nipper and , without incident. Pt will follow up in 9 weeks or sooner if any problems arise. Diagnosis was discussed with the pt and all of their questions were answered in detail. Proper foot hygiene and care was discussed with the pt. Informed patient on proper diabetic foot care and importance of tight glycemic control. Patient to check feet daily and contact the office with any questions/problems/concerns.    Other comorbidity noted and will be managed by PCP.  11/12/2018    Electronically signed by Teodora Jamil DPM on 11/12/2018 at 9:07 AM  11/12/2018

## 2019-01-14 ENCOUNTER — OFFICE VISIT (OUTPATIENT)
Dept: PODIATRY | Age: 84
End: 2019-01-14
Payer: MEDICARE

## 2019-01-14 VITALS — HEIGHT: 60 IN | WEIGHT: 180 LBS | BODY MASS INDEX: 35.34 KG/M2

## 2019-01-14 DIAGNOSIS — M79.671 PAIN IN BOTH FEET: ICD-10-CM

## 2019-01-14 DIAGNOSIS — B35.1 DERMATOPHYTOSIS OF NAIL: ICD-10-CM

## 2019-01-14 DIAGNOSIS — I73.9 PVD (PERIPHERAL VASCULAR DISEASE) (HCC): ICD-10-CM

## 2019-01-14 DIAGNOSIS — M79.672 PAIN IN BOTH FEET: ICD-10-CM

## 2019-01-14 DIAGNOSIS — E11.51 TYPE II DIABETES MELLITUS WITH PERIPHERAL CIRCULATORY DISORDER (HCC): Primary | ICD-10-CM

## 2019-01-14 PROCEDURE — 1123F ACP DISCUSS/DSCN MKR DOCD: CPT | Performed by: PODIATRIST

## 2019-01-14 PROCEDURE — G8400 PT W/DXA NO RESULTS DOC: HCPCS | Performed by: PODIATRIST

## 2019-01-14 PROCEDURE — 11721 DEBRIDE NAIL 6 OR MORE: CPT | Performed by: PODIATRIST

## 2019-01-14 PROCEDURE — 1101F PT FALLS ASSESS-DOCD LE1/YR: CPT | Performed by: PODIATRIST

## 2019-01-14 PROCEDURE — 4040F PNEUMOC VAC/ADMIN/RCVD: CPT | Performed by: PODIATRIST

## 2019-01-14 PROCEDURE — G8484 FLU IMMUNIZE NO ADMIN: HCPCS | Performed by: PODIATRIST

## 2019-01-14 PROCEDURE — 1090F PRES/ABSN URINE INCON ASSESS: CPT | Performed by: PODIATRIST

## 2019-01-14 PROCEDURE — G8427 DOCREV CUR MEDS BY ELIG CLIN: HCPCS | Performed by: PODIATRIST

## 2019-01-14 PROCEDURE — G8417 CALC BMI ABV UP PARAM F/U: HCPCS | Performed by: PODIATRIST

## 2019-01-14 PROCEDURE — 99213 OFFICE O/P EST LOW 20 MIN: CPT | Performed by: PODIATRIST

## 2019-01-14 PROCEDURE — 1036F TOBACCO NON-USER: CPT | Performed by: PODIATRIST

## 2019-03-18 ENCOUNTER — OFFICE VISIT (OUTPATIENT)
Dept: PODIATRY | Age: 84
End: 2019-03-18
Payer: MEDICARE

## 2019-03-18 VITALS — BODY MASS INDEX: 35.34 KG/M2 | WEIGHT: 180 LBS | HEIGHT: 60 IN

## 2019-03-18 DIAGNOSIS — M79.672 PAIN IN BOTH FEET: ICD-10-CM

## 2019-03-18 DIAGNOSIS — I73.9 PVD (PERIPHERAL VASCULAR DISEASE) (HCC): ICD-10-CM

## 2019-03-18 DIAGNOSIS — E11.51 TYPE II DIABETES MELLITUS WITH PERIPHERAL CIRCULATORY DISORDER (HCC): Primary | ICD-10-CM

## 2019-03-18 DIAGNOSIS — M79.671 PAIN IN BOTH FEET: ICD-10-CM

## 2019-03-18 DIAGNOSIS — B35.1 DERMATOPHYTOSIS OF NAIL: ICD-10-CM

## 2019-03-18 PROCEDURE — G8427 DOCREV CUR MEDS BY ELIG CLIN: HCPCS | Performed by: PODIATRIST

## 2019-03-18 PROCEDURE — 1123F ACP DISCUSS/DSCN MKR DOCD: CPT | Performed by: PODIATRIST

## 2019-03-18 PROCEDURE — 11721 DEBRIDE NAIL 6 OR MORE: CPT | Performed by: PODIATRIST

## 2019-03-18 PROCEDURE — 1036F TOBACCO NON-USER: CPT | Performed by: PODIATRIST

## 2019-03-18 PROCEDURE — G8417 CALC BMI ABV UP PARAM F/U: HCPCS | Performed by: PODIATRIST

## 2019-03-18 PROCEDURE — 1101F PT FALLS ASSESS-DOCD LE1/YR: CPT | Performed by: PODIATRIST

## 2019-03-18 PROCEDURE — 4040F PNEUMOC VAC/ADMIN/RCVD: CPT | Performed by: PODIATRIST

## 2019-03-18 PROCEDURE — G8400 PT W/DXA NO RESULTS DOC: HCPCS | Performed by: PODIATRIST

## 2019-03-18 PROCEDURE — 1090F PRES/ABSN URINE INCON ASSESS: CPT | Performed by: PODIATRIST

## 2019-03-18 PROCEDURE — G8484 FLU IMMUNIZE NO ADMIN: HCPCS | Performed by: PODIATRIST

## 2019-03-18 PROCEDURE — 99213 OFFICE O/P EST LOW 20 MIN: CPT | Performed by: PODIATRIST

## 2019-05-22 ENCOUNTER — OFFICE VISIT (OUTPATIENT)
Dept: PODIATRY | Age: 84
End: 2019-05-22
Payer: MEDICARE

## 2019-05-22 VITALS — WEIGHT: 180 LBS | HEIGHT: 60 IN | BODY MASS INDEX: 35.34 KG/M2

## 2019-05-22 DIAGNOSIS — M79.671 PAIN IN BOTH FEET: ICD-10-CM

## 2019-05-22 DIAGNOSIS — M79.672 PAIN IN BOTH FEET: ICD-10-CM

## 2019-05-22 DIAGNOSIS — M19.072 DEGENERATIVE JOINT DISEASE OF BOTH ANKLES AND FEET: ICD-10-CM

## 2019-05-22 DIAGNOSIS — I73.9 PVD (PERIPHERAL VASCULAR DISEASE) (HCC): ICD-10-CM

## 2019-05-22 DIAGNOSIS — B35.1 DERMATOPHYTOSIS OF NAIL: ICD-10-CM

## 2019-05-22 DIAGNOSIS — E11.51 TYPE II DIABETES MELLITUS WITH PERIPHERAL CIRCULATORY DISORDER (HCC): Primary | ICD-10-CM

## 2019-05-22 DIAGNOSIS — M19.071 DEGENERATIVE JOINT DISEASE OF BOTH ANKLES AND FEET: ICD-10-CM

## 2019-05-22 PROCEDURE — G8417 CALC BMI ABV UP PARAM F/U: HCPCS | Performed by: PODIATRIST

## 2019-05-22 PROCEDURE — 1090F PRES/ABSN URINE INCON ASSESS: CPT | Performed by: PODIATRIST

## 2019-05-22 PROCEDURE — G8400 PT W/DXA NO RESULTS DOC: HCPCS | Performed by: PODIATRIST

## 2019-05-22 PROCEDURE — 1123F ACP DISCUSS/DSCN MKR DOCD: CPT | Performed by: PODIATRIST

## 2019-05-22 PROCEDURE — 11721 DEBRIDE NAIL 6 OR MORE: CPT | Performed by: PODIATRIST

## 2019-05-22 PROCEDURE — 99213 OFFICE O/P EST LOW 20 MIN: CPT | Performed by: PODIATRIST

## 2019-05-22 PROCEDURE — G8427 DOCREV CUR MEDS BY ELIG CLIN: HCPCS | Performed by: PODIATRIST

## 2019-05-22 PROCEDURE — 4040F PNEUMOC VAC/ADMIN/RCVD: CPT | Performed by: PODIATRIST

## 2019-05-22 PROCEDURE — 1036F TOBACCO NON-USER: CPT | Performed by: PODIATRIST

## 2019-05-22 NOTE — PROGRESS NOTES
325 MG TABS Take by mouth      TURMERIC PO Take 1,500 mg by mouth      RESVERATROL PO Take 1,200 mg by mouth      OXYGEN 2 L/min      letrozole (FEMARA) 2.5 MG tablet Take 2.5 mg by mouth      HYDROcodone-acetaminophen (NORCO) 5-325 MG per tablet Take 1-2 tablets by mouth .  clotrimazole-betamethasone (LOTRISONE) 1-0.05 % cream Apply topically 2 times daily. 45 g 3    Magnesium Bisglycinate (MAG GLYCINATE) 100 MG TABS Take by mouth      tamsulosin (FLOMAX) 0.4 MG capsule Take 0.4 mg by mouth      QUINAPRIL HCL PO Take by mouth      Histidine HCl IZZY 1 tablet by Does not apply route daily      VITAMIN K PO Take 1 tablet by mouth daily Life extension super vitamin K      Biotin 5000 MCG CAPS Take 1 tablet by mouth daily      Acetylcarnitine HCl 500 MG CAPS Take 1 tablet by mouth daily      NATTOKINASE PO Take by mouth daily Doctor's Best nattokinase 2,000 FU daily      Probiotic Product (PROBIOTIC DAILY PO) Take 1 tablet by mouth daily NOW probiotic      NONFORMULARY Take 200 mg by mouth daily Ubiquinol      Ascorbic Acid (VITAMIN C) 500 MG tablet Take 1,000 mg by mouth 3 times daily Now Vitamin C with Gardenia Hips      Barberry-Oreg Grape-Goldenseal (BERBERINE COMPLEX PO) Take 500 mg by mouth daily      Horse [place] 300 MG CAPS Take 300 mg by mouth daily      Carnosine L POWD Take 500 mg by mouth daily Now L Carnosine      Borage, Borago officinalis, (BORAGE OIL PO) Take 1,000 mg by mouth every other day GLA Borage oil      metFORMIN (GLUCOPHAGE) 1000 MG tablet Take 1,000 mg by mouth 2 times daily      carvedilol (COREG) 12.5 MG tablet Take 12.5 mg by mouth 2 times daily Pt takes 1/2 pill twice a day.  6.25mg twice a day      Glutamine 500 MG CAPS Take 500 mg by mouth 2 times daily      Benfotiamine 150 MG CAPS Take 150 mg by mouth daily Doctor's Best Benfotiamine 150 mg daily      levothyroxine (SYNTHROID) 75 MCG tablet Take 75 mcg by mouth Daily      NONFORMULARY   Take 1 tablet by area)  [x] [x] [x] [x] [x] [x] [x] [x] [x] [x]  5 4 3 2 1 1 2 3 4 5                          Right                                        Left    Thickness  [x] [x] [x] [x] [x] [x] [x] [x] [x] [x]  5 4 3 2 1 1 2 3 4 5                         Right                                        Left    Dystrophic Changes   [x] [x] [x] [x] [x] [x] [x] [x] [x] [x]  5 4 3 2 1 1 2 3 4 5                         Right                                        Left    Color   [x] [x] [x] [x] [x] [x] [x] [x] [x] [x]  5 4 3 2 1 1 2 3 4 5                          Right                                        Left    Incurvation/Ingrowin   [] [] [] [] [] [] [] [] [] []  5 4 3 2 1 1 2 3 4 5                         Right                                        Left    Inflammation/Pain   [x] [x] [x] [x] [x] [x] [x] [x] [x] [x]  5 4 3 2 1 1 2 3 4 5                         Right                                        Left      Dermatologic Exam:  Skin lesion/ulceration Absent . Skin No rashes or nodules noted. .       Musculoskeletal:       Degenerative joint disease to the right and left midfoot with crepitus on ROM    1st MPJ ROM decreased, Bilateral.  Muscle strength 5/5, Bilateral.  Pain present upon palpation of toenails 1-5, Bilateral. decreased medial longitudinal arch, Bilateral.  Ankle ROM decreased,Bilateral.    Dorsally contracted digits present digits 2, Bilateral.     Vascular: DP and PT pulses palpable 1/4, Bilateral.  CFT <5 seconds, Bilateral.  Hair growth absent to the level of the digits, Bilateral.  Edema present, Bilateral.  Varicosities absent, Bilateral. Erythema absent, Bilateral    Neurological: Sensation diminshed to light touch to level of digits, Bilateral.  Protective sensation intact 6/10 sites via 5.07/10g [place] Monofilament, Bilateral.  negative Tinel's, Bilateral.  negative Valleix sign, Bilateral.      Integument: Warm, dry, supple, Bilateral.  Open lesion absent, Bilateral.  Interdigital maceration absent to web spaces 4, Bilateral.  Nails 1-5 left and 1-5 right thickened > 3.0 mm, dystrophic and crumbly, discolored with subungual debris. Fissures absent, Bilateral.     Visual inspection:  Deformity: hammertoe deformity federico feet  amputation: absent  Skin lesions: absent  Edema: right- 2+ pitting edema, left- 2+ pitting edema    Sensory exam:  Monofilament sensation: abnormal - 6/10 via SW 5.07/10g monofilament to the plantar foot bilateral feet    Pulses: abnormal - 1/4 dorsalis pedis pulse and 1/4 Posterior tibial pulse,   Pinprick: Impaired  Proprioception: Impaired  Vibration (128 Hz): Impaired       DM with PVD       [x]Yes    []No      Assessment:  82 y.o. female with:   Diagnosis Orders   1. Type II diabetes mellitus with peripheral circulatory disorder (HCC)  02154 - ID DEBRIDEMENT OF NAILS, 6 OR MORE    HM DIABETES FOOT EXAM   2. Dermatophytosis of nail  30323 - ID DEBRIDEMENT OF NAILS, 6 OR MORE    HM DIABETES FOOT EXAM   3. PVD (peripheral vascular disease) ([facility])  78982 - ID DEBRIDEMENT OF NAILS, 6 OR MORE    HM DIABETES FOOT EXAM   4. Pain in both feet  55090 - ID DEBRIDEMENT OF NAILS, 6 OR MORE    HM DIABETES FOOT EXAM   5. Degenerative joint disease of both ankles and feet             Q7   []Yes    []No                Q8   [x]Yes    []No                     Q9   []Yes    []No    Plan:   Pt was evaluated and examined. Patient was given personalized discharge instructions. For the DJD  X rays reviewed labs reviewed  Recommend OTC anti inflammatories. RICE therapy if pain worsens after activity   Recommend proper shoe gear with supportive archs. Nails 1-10 were debrided sharply in length and thickness with a nipper and , without incident. Pt will follow up in 9 weeks or sooner if any problems arise. Diagnosis was discussed with the pt and all of their questions were answered in detail. Proper foot hygiene and care was discussed with the pt.  Informed patient on proper diabetic foot

## 2019-07-24 ENCOUNTER — OFFICE VISIT (OUTPATIENT)
Dept: PODIATRY | Age: 84
End: 2019-07-24
Payer: MEDICARE

## 2019-07-24 VITALS — BODY MASS INDEX: 35.34 KG/M2 | WEIGHT: 180 LBS | HEIGHT: 60 IN

## 2019-07-24 DIAGNOSIS — I73.9 PVD (PERIPHERAL VASCULAR DISEASE) (HCC): ICD-10-CM

## 2019-07-24 DIAGNOSIS — M19.071 DEGENERATIVE JOINT DISEASE OF BOTH ANKLES AND FEET: ICD-10-CM

## 2019-07-24 DIAGNOSIS — M19.072 DEGENERATIVE JOINT DISEASE OF BOTH ANKLES AND FEET: ICD-10-CM

## 2019-07-24 DIAGNOSIS — B35.1 DERMATOPHYTOSIS OF NAIL: ICD-10-CM

## 2019-07-24 DIAGNOSIS — E11.51 TYPE II DIABETES MELLITUS WITH PERIPHERAL CIRCULATORY DISORDER (HCC): Primary | ICD-10-CM

## 2019-07-24 PROCEDURE — 4040F PNEUMOC VAC/ADMIN/RCVD: CPT | Performed by: PODIATRIST

## 2019-07-24 PROCEDURE — G8427 DOCREV CUR MEDS BY ELIG CLIN: HCPCS | Performed by: PODIATRIST

## 2019-07-24 PROCEDURE — 1090F PRES/ABSN URINE INCON ASSESS: CPT | Performed by: PODIATRIST

## 2019-07-24 PROCEDURE — 1036F TOBACCO NON-USER: CPT | Performed by: PODIATRIST

## 2019-07-24 PROCEDURE — G8417 CALC BMI ABV UP PARAM F/U: HCPCS | Performed by: PODIATRIST

## 2019-07-24 PROCEDURE — 11721 DEBRIDE NAIL 6 OR MORE: CPT | Performed by: PODIATRIST

## 2019-07-24 PROCEDURE — 99213 OFFICE O/P EST LOW 20 MIN: CPT | Performed by: PODIATRIST

## 2019-07-24 PROCEDURE — G8400 PT W/DXA NO RESULTS DOC: HCPCS | Performed by: PODIATRIST

## 2019-07-24 PROCEDURE — 1123F ACP DISCUSS/DSCN MKR DOCD: CPT | Performed by: PODIATRIST

## 2019-07-24 RX ORDER — ROSUVASTATIN CALCIUM 10 MG/1
TABLET, COATED ORAL
COMMUNITY

## 2019-07-24 NOTE — PROGRESS NOTES
0.5 tablets 3 times a week by oral route.  Proline 500 MG CAPS Take by mouth      Omeprazole (PRILOSEC PO) omeprazole   20mg daily      Aspirin Buf,[id number], (BUFFERED ASPIRIN) 325 MG TABS Take by mouth      TURMERIC PO Take 1,500 mg by mouth      RESVERATROL PO Take 1,200 mg by mouth      OXYGEN 2 L/min      letrozole (FEMARA) 2.5 MG tablet Take 2.5 mg by mouth      HYDROcodone-acetaminophen (NORCO) 5-325 MG per tablet Take 1-2 tablets by mouth .  clotrimazole-betamethasone (LOTRISONE) 1-0.05 % cream Apply topically 2 times daily. 45 g 3    Magnesium Bisglycinate (MAG GLYCINATE) 100 MG TABS Take by mouth      tamsulosin (FLOMAX) 0.4 MG capsule Take 0.4 mg by mouth      QUINAPRIL HCL PO Take by mouth      Histidine HCl IZZY 1 tablet by Does not apply route daily      VITAMIN K PO Take 1 tablet by mouth daily Life extension super vitamin K      Biotin 5000 MCG CAPS Take 1 tablet by mouth daily      Acetylcarnitine HCl 500 MG CAPS Take 1 tablet by mouth daily      NATTOKINASE PO Take by mouth daily Doctor's Best nattokinase 2,000 FU daily      Probiotic Product (PROBIOTIC DAILY PO) Take 1 tablet by mouth daily NOW probiotic      NONFORMULARY Take 200 mg by mouth daily Ubiquinol      Ascorbic Acid (VITAMIN C) 500 MG tablet Take 1,000 mg by mouth 3 times daily Now Vitamin C with Gardenia Hips      Barberry-Oreg Grape-Goldenseal (BERBERINE COMPLEX PO) Take 500 mg by mouth daily      Horse [place] 300 MG CAPS Take 300 mg by mouth daily      Carnosine L POWD Take 500 mg by mouth daily Now L Carnosine      Borage, Borago officinalis, (BORAGE OIL PO) Take 1,000 mg by mouth every other day GLA Borage oil      metFORMIN (GLUCOPHAGE) 1000 MG tablet Take 1,000 mg by mouth 2 times daily      carvedilol (COREG) 12.5 MG tablet Take 12.5 mg by mouth 2 times daily Pt takes 1/2 pill twice a day.  6.25mg twice a day      Glutamine 500 MG CAPS Take 500 mg by mouth 2 times daily      Benfotiamine for constipation, diarrhea, nausea and vomiting. Objective:  General: AAO x 3 in NAD. Derm  Toenail Description  Sites of Onychomycosis Involvement (Check affected area)  [x] [x] [x] [x] [x] [x] [x] [x] [x] [x]  5 4 3 2 1 1 2 3 4 5                          Right                                        Left    Thickness  [x] [x] [x] [x] [x] [x] [x] [x] [x] [x]  5 4 3 2 1 1 2 3 4 5                         Right                                        Left    Dystrophic Changes   [x] [x] [x] [x] [x] [x] [x] [x] [x] [x]  5 4 3 2 1 1 2 3 4 5                         Right                                        Left    Color   [x] [x] [x] [x] [x] [x] [x] [x] [x] [x]  5 4 3 2 1 1 2 3 4 5                          Right                                        Left    Incurvation/Ingrowin   [] [] [] [] [] [] [] [] [] []  5 4 3 2 1 1 2 3 4 5                         Right                                        Left    Inflammation/Pain   [x] [x] [x] [x] [x] [x] [x] [x] [x] [x]  5 4 3 2 1 1 2 3 4 5                         Right                                        Left      Dermatologic Exam:  Skin lesion/ulceration Absent . Skin No rashes or nodules noted. .       Musculoskeletal:     1st MPJ ROM decreased, Bilateral.  Degenerative joint disease to the right and left midfoot with crepitus on ROM      Muscle strength 5/5, Bilateral.  Pain present upon palpation of toenails 1-5, Bilateral. decreased medial longitudinal arch, Bilateral.  Ankle ROM decreased,Bilateral.    Dorsally contracted digits present digits 2, Bilateral.     Vascular: DP and PT pulses palpable 1/4, Bilateral.  CFT <5 seconds, Bilateral.  Hair growth absent to the level of the digits, Bilateral.  Edema present, Bilateral.  Varicosities absent, Bilateral. Erythema absent, Bilateral    Neurological: Sensation diminshed to light touch to level of digits, Bilateral.  Protective sensation intact 6/10 sites via 5.07/10g [place]-Mil Monofilament,

## 2019-09-25 ENCOUNTER — OFFICE VISIT (OUTPATIENT)
Dept: PODIATRY | Age: 84
End: 2019-09-25
Payer: MEDICARE

## 2019-09-25 VITALS — HEIGHT: 60 IN | BODY MASS INDEX: 35.34 KG/M2 | WEIGHT: 180 LBS

## 2019-09-25 DIAGNOSIS — E11.51 TYPE II DIABETES MELLITUS WITH PERIPHERAL CIRCULATORY DISORDER (HCC): Primary | ICD-10-CM

## 2019-09-25 DIAGNOSIS — I73.9 PVD (PERIPHERAL VASCULAR DISEASE) (HCC): ICD-10-CM

## 2019-09-25 DIAGNOSIS — M79.672 PAIN IN BOTH FEET: ICD-10-CM

## 2019-09-25 DIAGNOSIS — M79.671 PAIN IN BOTH FEET: ICD-10-CM

## 2019-09-25 DIAGNOSIS — B35.1 DERMATOPHYTOSIS OF NAIL: ICD-10-CM

## 2019-09-25 DIAGNOSIS — R60.0 EDEMA OF LOWER EXTREMITY: ICD-10-CM

## 2019-09-25 PROCEDURE — 1090F PRES/ABSN URINE INCON ASSESS: CPT | Performed by: PODIATRIST

## 2019-09-25 PROCEDURE — 4040F PNEUMOC VAC/ADMIN/RCVD: CPT | Performed by: PODIATRIST

## 2019-09-25 PROCEDURE — 1036F TOBACCO NON-USER: CPT | Performed by: PODIATRIST

## 2019-09-25 PROCEDURE — G8400 PT W/DXA NO RESULTS DOC: HCPCS | Performed by: PODIATRIST

## 2019-09-25 PROCEDURE — 11721 DEBRIDE NAIL 6 OR MORE: CPT | Performed by: PODIATRIST

## 2019-09-25 PROCEDURE — 99213 OFFICE O/P EST LOW 20 MIN: CPT | Performed by: PODIATRIST

## 2019-09-25 PROCEDURE — G8417 CALC BMI ABV UP PARAM F/U: HCPCS | Performed by: PODIATRIST

## 2019-09-25 PROCEDURE — 1123F ACP DISCUSS/DSCN MKR DOCD: CPT | Performed by: PODIATRIST

## 2019-09-25 PROCEDURE — G8427 DOCREV CUR MEDS BY ELIG CLIN: HCPCS | Performed by: PODIATRIST

## 2019-09-25 NOTE — PROGRESS NOTES
[facility] PODIATRY  [address] [facility] [address]  Dept: [phone]    DIABETIC PROGRESS NOTE  Date of patient's visit: 9/25/2019  Patient's Name:  Brianne Gooden YOB: 1935            Patient Care Team:  Tawanna Duggan DO as PCP - Ritesh Capellan MD as Consulting Physician (Hematology and Oncology)  Erica Birch MD as Surgeon (General Surgery)  Flaco Haq MD as Physician (Radiation Oncology)          Chief Complaint   Patient presents with    Diabetes    Nail Problem    Circulatory Problem       Subjective:   Brianne Gooden comes to clinic for Diabetes; Nail Problem; and Circulatory Problem    she is a diabetic and states that she checks her feet daily. Pt currently has complaint of thickened, elongated nails that they cannot manage by themselves. Pt's primary care physician is Tawanna Duggan DO last seen April 2019   Pt's last blood sugar was patient states she has not checked her blood sugar lately . Pt has a new complaint of swelling to the right and left leg that is getting worse. Pt relates to being on the feet more and sleeping in a chair     No results found for: LABA1C   Complains of numbness in the feet bilat.   Past Medical History:   Diagnosis Date    Cancer of left breast ([address]     Chronic back pain     Hyperlipidemia     Hypertension     takes supplement     Hypothyroid     Type II or unspecified type diabetes mellitus without mention of complication, not stated as uncontrolled     Wears glasses     reading    Wears partial dentures     upper right tooth       Allergies   Allergen Reactions    Adhesive Tape      Takes skin off    Dust Mite Extract     Molds & Smuts     Pollen Extract     Tetanus Toxoid Other (See Comments)     Current Outpatient Medications on File Prior to Visit   Medication Sig Dispense Refill    rosuvastatin (CRESTOR) 10 MG tablet rosuvastatin 10 mg tablet   Take 0.5 tablets 3 Bilateral.  negative Tinel's, Bilateral.  negative Valleix sign, Bilateral.      Integument: Warm, dry, supple, Bilateral.  Open lesion absent, Bilateral.  Interdigital maceration absent to web spaces 4, Bilateral.  Nails 1-5 left and 1-5 right thickened > 3.0 mm, dystrophic and crumbly, discolored with subungual debris. Fissures absent, Bilateral.     Visual inspection:  Deformity: hammertoe deformity federico feet  amputation: absent  Skin lesions: absent  Edema: right- 2+ pitting edema, left- 2+ pitting edema    Sensory exam:  Monofilament sensation: abnormal - 6/10 via SW 5.07/10g monofilament to the plantar foot bilateral feet    Pulses: abnormal - 1/4 dorsalis pedis pulse and 1/4 Posterior tibial pulse,   Pinprick: Impaired  Proprioception: Impaired  Vibration (128 Hz): Impaired       DM with PVD       [x]Yes    []No      Assessment:  82 y.o. female with:   Diagnosis Orders   1. Type II diabetes mellitus with peripheral circulatory disorder (HCC)  83332 - [place] DEBRIDEMENT OF NAILS, 6 OR MORE    HM DIABETES FOOT EXAM   2. Dermatophytosis of nail  38093 - [place] DEBRIDEMENT OF NAILS, 6 OR MORE    HM DIABETES FOOT EXAM   3. PVD (peripheral vascular disease) ([facility])  29099 - [place] DEBRIDEMENT OF NAILS, 6 OR MORE    HM DIABETES FOOT EXAM   4. Pain in both feet  69263 - [place] DEBRIDEMENT OF NAILS, 6 OR MORE    HM DIABETES FOOT EXAM   5. Edema of lower extremity             Q7   []Yes    []No                Q8   [x]Yes    []No                     Q9   []Yes    []No    Plan:   Pt was evaluated and examined. Patient was given personalized discharge instructions. For the leg swelling  Rx given for compression stockings to be worn during the day. Pt to elevate at night above the heart     Nails 1-10 were debrided sharply in length and thickness with a nipper and , without incident. Pt will follow up in 9 weeks or sooner if any problems arise.  Diagnosis was discussed with the pt and all of their questions were answered in detail. Proper foot hygiene and care was discussed with the pt. Informed patient on proper diabetic foot care and importance of tight glycemic control. Patient to check feet daily and contact the office with any questions/problems/concerns.    Other comorbidity noted and will be managed by PCP.  9/25/2019    Electronically signed by Betty Lilly DPM on 9/25/2019 at 9:00 AM  9/25/2019

## 2019-11-27 ENCOUNTER — OFFICE VISIT (OUTPATIENT)
Dept: PODIATRY | Age: 84
End: 2019-11-27
Payer: MEDICARE

## 2019-11-27 VITALS — BODY MASS INDEX: 35.34 KG/M2 | WEIGHT: 180 LBS | HEIGHT: 60 IN

## 2019-11-27 DIAGNOSIS — M79.672 PAIN IN BOTH FEET: ICD-10-CM

## 2019-11-27 DIAGNOSIS — E11.51 TYPE II DIABETES MELLITUS WITH PERIPHERAL CIRCULATORY DISORDER (HCC): Primary | ICD-10-CM

## 2019-11-27 DIAGNOSIS — L85.3 XEROSIS OF SKIN: ICD-10-CM

## 2019-11-27 DIAGNOSIS — I73.9 PVD (PERIPHERAL VASCULAR DISEASE) (HCC): ICD-10-CM

## 2019-11-27 DIAGNOSIS — B35.1 DERMATOPHYTOSIS OF NAIL: ICD-10-CM

## 2019-11-27 DIAGNOSIS — M79.671 PAIN IN BOTH FEET: ICD-10-CM

## 2019-11-27 PROCEDURE — 99213 OFFICE O/P EST LOW 20 MIN: CPT | Performed by: PODIATRIST

## 2019-11-27 PROCEDURE — 1036F TOBACCO NON-USER: CPT | Performed by: PODIATRIST

## 2019-11-27 PROCEDURE — 4040F PNEUMOC VAC/ADMIN/RCVD: CPT | Performed by: PODIATRIST

## 2019-11-27 PROCEDURE — 11721 DEBRIDE NAIL 6 OR MORE: CPT | Performed by: PODIATRIST

## 2019-11-27 PROCEDURE — G8417 CALC BMI ABV UP PARAM F/U: HCPCS | Performed by: PODIATRIST

## 2019-11-27 PROCEDURE — 1090F PRES/ABSN URINE INCON ASSESS: CPT | Performed by: PODIATRIST

## 2019-11-27 PROCEDURE — G8427 DOCREV CUR MEDS BY ELIG CLIN: HCPCS | Performed by: PODIATRIST

## 2019-11-27 PROCEDURE — 1123F ACP DISCUSS/DSCN MKR DOCD: CPT | Performed by: PODIATRIST

## 2019-11-27 PROCEDURE — G8484 FLU IMMUNIZE NO ADMIN: HCPCS | Performed by: PODIATRIST

## 2019-11-27 PROCEDURE — G8400 PT W/DXA NO RESULTS DOC: HCPCS | Performed by: PODIATRIST

## 2020-02-03 ENCOUNTER — OFFICE VISIT (OUTPATIENT)
Dept: PODIATRY | Age: 85
End: 2020-02-03
Payer: MEDICARE

## 2020-02-03 VITALS — HEIGHT: 60 IN | BODY MASS INDEX: 31.8 KG/M2 | WEIGHT: 162 LBS

## 2020-02-03 PROCEDURE — 11721 DEBRIDE NAIL 6 OR MORE: CPT | Performed by: PODIATRIST

## 2020-02-03 RX ORDER — BIOTIN 1 MG
500 TABLET ORAL
COMMUNITY

## 2020-02-03 NOTE — PROGRESS NOTES
[address] [facility] PODIATRY  [address] [facility] [address]  Dept: [phone]    DIABETIC PROGRESS NOTE  Date of patient's visit: 2/3/2020  Patient's Name:  Rubia Brasher YOB: 1935            Patient Care Team:  Donald Goodrich DO as PCP - Evi Bonner MD as Consulting Physician (Hematology and Oncology)  Antonio Ricks MD as Surgeon (General Surgery)  Karoline To MD as Physician (Radiation Oncology)          Chief Complaint   Patient presents with    Diabetes    Nail Problem    Circulatory Problem       Subjective:   Rubia Brasher comes to clinic for Diabetes; Nail Problem; and Circulatory Problem    she is a diabetic and states that she checks her feet daily. Pt currently has complaint of thickened, elongated nails that they cannot manage by themselves. Pt's primary care physician is Donald Goodrich DO last seen January 20 2020   Pt's last blood sugar was 80 . Pt has no other complaints. No results found for: LABA1C   Complains of numbness in the feet bilat. Past Medical History:   Diagnosis Date    Cancer of left breast ([facility] [address]     Chronic back pain     Hyperlipidemia     Hypertension     takes supplement     Hypothyroid     Type II or unspecified type diabetes mellitus without mention of complication, not stated as uncontrolled     Wears glasses     reading    Wears partial dentures     upper right tooth       Allergies   Allergen Reactions    Adhesive Tape      Takes skin off    Dust Mite Extract     Molds & Smuts     Pollen Extract     Tetanus Toxoid Other (See Comments)     Current Outpatient Medications on File Prior to Visit   Medication Sig Dispense Refill    Chromium Picolinate 400 MCG TABS Take 500 mcg by mouth      rosuvastatin (CRESTOR) 10 MG tablet rosuvastatin 10 mg tablet   Take 0.5 tablets 3 times a week by oral route.       Proline 500 MG CAPS Take by mouth      Omeprazole (PRILOSEC PO) omeprazole Valleix sign, Bilateral.      Integument: Warm, dry, supple, Bilateral.  Open lesion absent, Bilateral.  Interdigital maceration absent to web spaces 4, Bilateral.  Nails 1-5 left and 1-5 right thickened > 3.0 mm, dystrophic and crumbly, discolored with subungual debris. Fissures absent, Bilateral.     Visual inspection:  Deformity: hammertoe deformity federico feet  amputation: absent  Skin lesions: absent  Edema: right- 2+ pitting edema, left- 2+ pitting edema    Sensory exam:  Monofilament sensation: abnormal - 6/10 via SW 5.07/10g monofilament to the plantar foot bilateral feet    Pulses: abnormal - 1/4 dorsalis pedis pulse and 1/4 Posterior tibial pulse,   Pinprick: Impaired  Proprioception: Impaired  Vibration (128 Hz): Impaired       DM with PVD       [x]Yes    []No      Assessment:  82 y.o. female with:   Diagnosis Orders   1. Type II diabetes mellitus with peripheral circulatory disorder ([facility])  79016 - [place] DEBRIDEMENT OF NAILS, 6 OR MORE    HM DIABETES FOOT EXAM   2. Dermatophytosis of nail  39384 - [place] DEBRIDEMENT OF NAILS, 6 OR MORE    HM DIABETES FOOT EXAM   3. PVD (peripheral vascular disease) ([facility])  05602 - [place] DEBRIDEMENT OF NAILS, 6 OR MORE    HM DIABETES FOOT EXAM   4. Pain in both feet  97689 - [place] DEBRIDEMENT OF NAILS, 6 OR MORE    HM DIABETES FOOT EXAM           Q7   []Yes    []No                Q8   [x]Yes    []No                     Q9   []Yes    []No    Plan:   Pt was evaluated and examined. Patient was given personalized discharge instructions. Nails 1-10 were debrided sharply in length and thickness with a nipper and , without incident. Pt will follow up in 9 weeks or sooner if any problems arise. Diagnosis was discussed with the pt and all of their questions were answered in detail. Proper foot hygiene and care was discussed with the pt. Informed patient on proper diabetic foot care and importance of tight glycemic control.   Patient to check feet daily and contact the office with any

## 2020-05-13 ENCOUNTER — OFFICE VISIT (OUTPATIENT)
Dept: PODIATRY | Age: 85
End: 2020-05-13
Payer: MEDICARE

## 2020-05-13 VITALS — HEIGHT: 60 IN | BODY MASS INDEX: 30.63 KG/M2 | WEIGHT: 156 LBS | TEMPERATURE: 98.2 F

## 2020-05-13 PROCEDURE — 11721 DEBRIDE NAIL 6 OR MORE: CPT | Performed by: PODIATRIST

## 2020-05-13 NOTE — PROGRESS NOTES
with any questions/problems/concerns.    Other comorbidity noted and will be managed by PCP.  5/13/2020    Electronically signed by Albaro Mora DPM on 5/13/2020 at 9:19 AM  5/13/2020

## 2020-07-20 ENCOUNTER — OFFICE VISIT (OUTPATIENT)
Dept: PODIATRY | Age: 85
End: 2020-07-20
Payer: MEDICARE

## 2020-07-20 VITALS — HEIGHT: 60 IN | TEMPERATURE: 97.6 F | WEIGHT: 147 LBS | BODY MASS INDEX: 28.86 KG/M2

## 2020-07-20 PROCEDURE — 11721 DEBRIDE NAIL 6 OR MORE: CPT | Performed by: PODIATRIST

## 2020-07-20 PROCEDURE — 99999 PR OFFICE/OUTPT VISIT,PROCEDURE ONLY: CPT | Performed by: PODIATRIST

## 2020-07-20 NOTE — PROGRESS NOTES
[address] [place] [address] PODIATRY  [address] [facility] [address]  Dept: [phone]    DIABETIC PROGRESS NOTE  Date of patient's visit: 7/20/2020  Patient's Name:  Dewitt Hodgkins YOB: 1935            Patient Care Team:  Bradley Palmer DO as PCP - Malia Varner MD as Consulting Physician (Hematology and Oncology)  Javier Quan MD as Surgeon (General Surgery)  Armin Torres MD as Physician (Radiation Oncology)          Chief Complaint   Patient presents with    Diabetes    Nail Problem    Peripheral Neuropathy       Subjective:   Dewitt Hodgkins comes to clinic for Diabetes; Nail Problem; and Peripheral Neuropathy    she is a diabetic and states that she checks her feet daily. Pt currently has complaint of thickened, elongated nails that they cannot manage by themselves. Pt's primary care physician is Bradley Palmer DO last seen January 20 2020   Pt's last blood sugar was 88 . Pt has a new complaint of NONE. No results found for: LABA1C   Complains of numbness in the feet bilat. Past Medical History:   Diagnosis Date    Cancer of left breast ([facility] [address]     Chronic back pain     Hyperlipidemia     Hypertension     takes supplement     Hypothyroid     Type II or unspecified type diabetes mellitus without mention of complication, not stated as uncontrolled     Wears glasses     reading    Wears partial dentures     upper right tooth       Allergies   Allergen Reactions    Adhesive Tape      Takes skin off    Dust Mite Extract     Molds & Smuts     Pollen Extract     Tetanus Toxoid Other (See Comments)     Current Outpatient Medications on File Prior to Visit   Medication Sig Dispense Refill    Chromium Picolinate 400 MCG TABS Take 500 mcg by mouth      rosuvastatin (CRESTOR) 10 MG tablet rosuvastatin 10 mg tablet   Take 0.5 tablets 3 times a week by oral route.       Proline 500 MG CAPS Take by mouth      Omeprazole (PRILOSEC PO) omeprazole   20mg daily      Aspirin Buf,[id number], (BUFFERED ASPIRIN) 325 MG TABS Take by mouth      TURMERIC PO Take 1,500 mg by mouth      RESVERATROL PO Take 1,200 mg by mouth      OXYGEN 2 L/min      letrozole (FEMARA) 2.5 MG tablet Take 2.5 mg by mouth      HYDROcodone-acetaminophen (NORCO) 5-325 MG per tablet Take 1-2 tablets by mouth .  clotrimazole-betamethasone (LOTRISONE) 1-0.05 % cream Apply topically 2 times daily. 45 g 3    Magnesium Bisglycinate (MAG GLYCINATE) 100 MG TABS Take by mouth      tamsulosin (FLOMAX) 0.4 MG capsule Take 0.4 mg by mouth      QUINAPRIL HCL PO Take by mouth      Histidine HCl IZZY 1 tablet by Does not apply route daily      VITAMIN K PO Take 1 tablet by mouth daily Life extension super vitamin K      Biotin 5000 MCG CAPS Take 1 tablet by mouth daily      Acetylcarnitine HCl 500 MG CAPS Take 1 tablet by mouth daily      NATTOKINASE PO Take by mouth daily Doctor's Best nattokinase 2,000 FU daily      Probiotic Product (PROBIOTIC DAILY PO) Take 1 tablet by mouth daily NOW probiotic      NONFORMULARY Take 200 mg by mouth daily Ubiquinol      Ascorbic Acid (VITAMIN C) 500 MG tablet Take 1,000 mg by mouth 3 times daily Now Vitamin C with Gardenia Hips      Barberry-Oreg Grape-Goldenseal (BERBERINE COMPLEX PO) Take 500 mg by mouth daily      Horse [place] 300 MG CAPS Take 300 mg by mouth daily      Carnosine L POWD Take 500 mg by mouth daily Now L Carnosine      Borage, Borago officinalis, (BORAGE OIL PO) Take 1,000 mg by mouth every other day GLA Borage oil      metFORMIN (GLUCOPHAGE) 1000 MG tablet Take 1,000 mg by mouth 2 times daily      carvedilol (COREG) 12.5 MG tablet Take 12.5 mg by mouth 2 times daily Pt takes 1/2 pill twice a day.  6.25mg twice a day      Glutamine 500 MG CAPS Take 500 mg by mouth 2 times daily      Benfotiamine 150 MG CAPS Take 150 mg by mouth daily Doctor's Best Benfotiamine 150 mg daily      levothyroxine (SYNTHROID) 75 Absent . Skin No rashes or nodules noted. .   Skin is thin, with flaky sloughing skin as well as decreased hair growth to the lower leg  Small red hemosiderin deposits seen dorsal foot   Musculoskeletal:     1st MPJ ROM decreased, Bilateral.  Muscle strength 5/5, Bilateral.  Pain present upon palpation of toenails 1-5, Bilateral. decreased medial longitudinal arch, Bilateral.  Ankle ROM decreased,Bilateral.    Dorsally contracted digits present digits 2, Bilateral.     Vascular: DP pulses 1/4 bilateral.  PT pulses 0/4 bilateral.   CFT <5 seconds, Bilateral.  Hair growth absent to the level of the digits, Bilateral.  Edema present, Bilateral.  Varicosities absent, Bilateral. Erythema absent, Bilateral    Neurological: Sensation diminshed to light touch to level of digits, Bilateral.  Protective sensation intact 6/10 sites via 5.07/10g [place] Monofilament, Bilateral.  negative Tinel's, Bilateral.  negative Valleix sign, Bilateral.      Integument: Warm, dry, supple, Bilateral.  Open lesion absent, Bilateral.  Interdigital maceration absent to web spaces 4, Bilateral.  Nails 1-5 left and 1-5 right thickened > 3.0 mm, dystrophic and crumbly, discolored with subungual debris. Fissures absent, Bilateral.   General: AAO x 3 in NAD.     Derm  Toenail Description  Sites of Onychomycosis Involvement (Check affected area)  [x] [x] [x] [x] [x] [x] [x] [x] [x] [x]  5 4 3 2 1 1 2 3 4 5                          Right                                        Left    Thickness  [x] [x] [x] [x] [x] [x] [x] [x] [x] [x]  5 4 3 2 1 1 2 3 4 5                         Right                                        Left    Dystrophic Changes   [x] [x] [x] [x] [x] [x] [x] [x] [x] [x]  5 4 3 2 1 1 2 3 4 5                         Right                                        Left    Color   [x] [x] [x] [x] [x] [x] [x] [x] [x] [x]  5 4 3 2 1 1 2 3 4 5                          Right Left    Incurvation/Ingrowin   [] [] [] [] [] [] [] [] [] []  5 4 3 2 1 1 2 3 4 5                         Right                                        Left    Inflammation/Pain   [x] [x] [x] [x] [x] [x] [x] [x] [x] [x]  5 4 3 2 1 1 2 3 4 5                         Right                                        Left        Visual inspection:  Deformity: hammertoe deformity federico feet  amputation: absent  Skin lesions: absent  Edema: right- 2+ pitting edema, left- 2+ pitting edema    Sensory exam:  Monofilament sensation: abnormal - 6/10 via SW 5.07/10g monofilament to the plantar foot bilateral feet    Pulses: abnormal - 1/4 dorsalis pedis pulse and 1/4 Posterior tibial pulse,   Pinprick: Impaired  Proprioception: Impaired  Vibration (128 Hz): Impaired       DM with PVD       [x]Yes    []No      Assessment:  82 y.o. female with:   Diagnosis Orders   1. Type II diabetes mellitus with peripheral circulatory disorder ([facility])  08557 - [place] DEBRIDEMENT OF NAILS, 6 OR MORE    HM DIABETES FOOT EXAM   2. Dermatophytosis of nail  19572 - [place] DEBRIDEMENT OF NAILS, 6 OR MORE    HM DIABETES FOOT EXAM   3. PVD (peripheral vascular disease) ([facility])  21844 - [place] DEBRIDEMENT OF NAILS, 6 OR MORE    HM DIABETES FOOT EXAM   4. Pain in both feet  74875 - [place] DEBRIDEMENT OF NAILS, 6 OR MORE    HM DIABETES FOOT EXAM           Q7   []Yes    []No                Q8   [x]Yes    []No                     Q9   []Yes    []No    Plan:   Pt was evaluated and examined. Patient was given personalized discharge instructions. Nails 1-10 were debrided sharply in length and thickness with a nipper and , without incident. Pt will follow up in 9 weeks or sooner if any problems arise. Diagnosis was discussed with the pt and all of their questions were answered in detail. Proper foot hygiene and care was discussed with the pt. Informed patient on proper diabetic foot care and importance of tight glycemic control.   Patient to check feet daily and contact the office with any questions/problems/concerns.    Other comorbidity noted and will be managed by PCP.  7/20/2020    Electronically signed by Reggie Bonner DPM on 7/20/2020 at 9:07 AM  7/20/2020

## 2020-09-21 ENCOUNTER — OFFICE VISIT (OUTPATIENT)
Dept: PODIATRY | Age: 85
End: 2020-09-21
Payer: MEDICARE

## 2020-09-21 VITALS — HEIGHT: 60 IN | BODY MASS INDEX: 28.47 KG/M2 | TEMPERATURE: 96.6 F | WEIGHT: 145 LBS

## 2020-09-21 PROCEDURE — 99213 OFFICE O/P EST LOW 20 MIN: CPT | Performed by: PODIATRIST

## 2020-09-21 PROCEDURE — G8400 PT W/DXA NO RESULTS DOC: HCPCS | Performed by: PODIATRIST

## 2020-09-21 PROCEDURE — 1123F ACP DISCUSS/DSCN MKR DOCD: CPT | Performed by: PODIATRIST

## 2020-09-21 PROCEDURE — 11721 DEBRIDE NAIL 6 OR MORE: CPT | Performed by: PODIATRIST

## 2020-09-21 PROCEDURE — G8427 DOCREV CUR MEDS BY ELIG CLIN: HCPCS | Performed by: PODIATRIST

## 2020-09-21 PROCEDURE — G8417 CALC BMI ABV UP PARAM F/U: HCPCS | Performed by: PODIATRIST

## 2020-09-21 PROCEDURE — 4040F PNEUMOC VAC/ADMIN/RCVD: CPT | Performed by: PODIATRIST

## 2020-09-21 PROCEDURE — 1090F PRES/ABSN URINE INCON ASSESS: CPT | Performed by: PODIATRIST

## 2020-09-21 PROCEDURE — 1036F TOBACCO NON-USER: CPT | Performed by: PODIATRIST

## 2020-09-21 NOTE — PROGRESS NOTES
500 mcg by mouth      rosuvastatin (CRESTOR) 10 MG tablet rosuvastatin 10 mg tablet   Take 0.5 tablets 3 times a week by oral route.  Proline 500 MG CAPS Take by mouth      Omeprazole (PRILOSEC PO) omeprazole   20mg daily      Aspirin Buf,[id number], (BUFFERED ASPIRIN) 325 MG TABS Take by mouth      TURMERIC PO Take 1,500 mg by mouth      RESVERATROL PO Take 1,200 mg by mouth      OXYGEN 2 L/min      letrozole (FEMARA) 2.5 MG tablet Take 2.5 mg by mouth      HYDROcodone-acetaminophen (NORCO) 5-325 MG per tablet Take 1-2 tablets by mouth .  clotrimazole-betamethasone (LOTRISONE) 1-0.05 % cream Apply topically 2 times daily. 45 g 3    Magnesium Bisglycinate (MAG GLYCINATE) 100 MG TABS Take by mouth      tamsulosin (FLOMAX) 0.4 MG capsule Take 0.4 mg by mouth      QUINAPRIL HCL PO Take by mouth      Histidine HCl IZZY 1 tablet by Does not apply route daily      VITAMIN K PO Take 1 tablet by mouth daily Life extension super vitamin K      Biotin 5000 MCG CAPS Take 1 tablet by mouth daily      Acetylcarnitine HCl 500 MG CAPS Take 1 tablet by mouth daily      NATTOKINASE PO Take by mouth daily Doctor's Best nattokinase 2,000 FU daily      Probiotic Product (PROBIOTIC DAILY PO) Take 1 tablet by mouth daily NOW probiotic      NONFORMULARY Take 200 mg by mouth daily Ubiquinol      Ascorbic Acid (VITAMIN C) 500 MG tablet Take 1,000 mg by mouth 3 times daily Now Vitamin C with Gardenia Hips      Barberry-Oreg Grape-Goldenseal (BERBERINE COMPLEX PO) Take 500 mg by mouth daily      Horse [place] 300 MG CAPS Take 300 mg by mouth daily      Carnosine L POWD Take 500 mg by mouth daily Now L Carnosine      Borage, Borago officinalis, (BORAGE OIL PO) Take 1,000 mg by mouth every other day GLA Borage oil      metFORMIN (GLUCOPHAGE) 1000 MG tablet Take 1,000 mg by mouth 2 times daily      carvedilol (COREG) 12.5 MG tablet Take 12.5 mg by mouth 2 times daily Pt takes 1/2 pill twice a day.  6.25mg twice a day      Glutamine 500 MG CAPS Take 500 mg by mouth 2 times daily      Benfotiamine 150 MG CAPS Take 150 mg by mouth daily Doctor's Best Benfotiamine 150 mg daily      levothyroxine (SYNTHROID) 75 MCG tablet Take 75 mcg by mouth Daily      NONFORMULARY   Take 1 tablet by mouth every other day iodoral every other day      Alpha-Lipoic Acid (LIPOIC ACID PO)   Take 300 mg by mouth nightly       Lysine 1000 MG TABS Take by mouth Lysine 1000mg and proline 500 mg daily      VITAMIN D, CHOLECALCIFEROL, PO Take 6,000 Int'l Units by mouth daily      spironolactone (ALDACTONE) 25 MG tablet Take 25 mg by mouth daily      Omega-3 Fatty Acids (FISH OIL CONCENTRATE PO) Take by mouth daily Fish oil 1 teaspoon daily      cloNIDine (CATAPRES) 0.1 MG tablet   Take 0.1 mg by mouth daily       GLUTATHIONE PO Take 25 mg by mouth daily NOW brand glutathione      Astaxanthin 4 MG CAPS Take 4 mg by mouth daily NOW brand      Arginine 500 MG CAPS Take 500 mg by mouth daily Now brand L-arginine      GLYCINE PO Take 1,000 mg by mouth 2 times daily      RED YEAST RICE EXTRACT PO Take 1,200 mg by mouth 2 times daily      vitamin E 400 UNIT capsule Take 400 Units by mouth daily Mixed tocopherols      vitamin A 13132 UNITS capsule Take 10,000 Units by mouth daily      magnesium citrate solution Take 296 mLs by mouth as needed for Constipation       No current facility-administered medications on file prior to visit. Review of Systems    Review of Systems:   History obtained from chart review and the patient  General ROS: negative for - chills, fatigue, fever, night sweats or weight gain  Constitutional: Negative for chills, diaphoresis, fatigue, fever and unexpected weight change. Musculoskeletal: Positive for arthralgias, gait problem and joint swelling. Neurological ROS: negative for - behavioral changes, confusion, headaches or seizures. Negative for weakness and numbness.    Dermatological ROS: negative for - [x] [x] [x] [x] [x] [x] [x] [x] [x] [x]  5 4 3 2 1 1 2 3 4 5                         Right                                        Left    Color   [x] [x] [x] [x] [x] [x] [x] [x] [x] [x]  5 4 3 2 1 1 2 3 4 5                          Right                                        Left    Incurvation/Ingrowin   [] [] [] [] [] [] [] [] [] []  5 4 3 2 1 1 2 3 4 5                         Right                                        Left    Inflammation/Pain   [x] [x] [x] [x] [x] [x] [x] [x] [x] [x]  5 4 3 2 1 1 2 3 4 5                         Right                                        Left        Visual inspection:  Deformity: hammertoe deformity federico feet  amputation: absent  Skin lesions: absent  Edema: right- 2+ pitting edema, left- 2+ pitting edema    Sensory exam:  Monofilament sensation: abnormal - 6/10 via SW 5.07/10g monofilament to the plantar foot bilateral feet    Pulses: abnormal - 1/4 dorsalis pedis pulse and 1/4 Posterior tibial pulse,   Pinprick: Impaired  Proprioception: Impaired  Vibration (128 Hz): Impaired       DM with PVD       [x]Yes    []No      Assessment:  82 y.o. female with:   Diagnosis Orders   1. Type II diabetes mellitus with peripheral circulatory disorder ([facility])  [address]85 - ID DEBRIDEMENT OF NAILS, 6 OR MORE    HM DIABETES FOOT EXAM   2. Dermatophytosis of nail  84767 - ID DEBRIDEMENT OF NAILS, 6 OR MORE    HM DIABETES FOOT EXAM   3. PVD (peripheral vascular disease) ([facility])  63572 - ID DEBRIDEMENT OF NAILS, 6 OR MORE    HM DIABETES FOOT EXAM   4. Pain in both feet  23686 - ID DEBRIDEMENT OF NAILS, 6 OR MORE    HM DIABETES FOOT EXAM   5. Degenerative joint disease of both ankles and feet  HM DIABETES FOOT EXAM           Q7   []Yes    []No                Q8   [x]Yes    []No                     Q9   []Yes    []No    Plan:   Pt was evaluated and examined. Patient was given personalized discharge instructions. For the new  DJD  X rays reviewed labs reviewed  Recommend OTC anti inflammatories. RICE therapy if pain worsens after activity   Recommend proper shoe gear with supportive archs. Nails 1-10 were debrided sharply in length and thickness with a nipper and , without incident. Pt will follow up in 9 weeks or sooner if any problems arise. Diagnosis was discussed with the pt and all of their questions were answered in detail. Proper foot hygiene and care was discussed with the pt. Informed patient on proper diabetic foot care and importance of tight glycemic control. Patient to check feet daily and contact the office with any questions/problems/concerns.    Other comorbidity noted and will be managed by PCP.  9/21/2020    Electronically signed by Derrick Birch DPM on 9/21/2020 at 9:02 AM  9/21/2020

## 2020-12-07 ENCOUNTER — OFFICE VISIT (OUTPATIENT)
Dept: PODIATRY | Age: 85
End: 2020-12-07
Payer: MEDICARE

## 2020-12-07 VITALS — WEIGHT: 145 LBS | TEMPERATURE: 97.2 F | HEIGHT: 60 IN | BODY MASS INDEX: 28.47 KG/M2

## 2020-12-07 PROCEDURE — G8484 FLU IMMUNIZE NO ADMIN: HCPCS | Performed by: PODIATRIST

## 2020-12-07 PROCEDURE — 11721 DEBRIDE NAIL 6 OR MORE: CPT | Performed by: PODIATRIST

## 2020-12-07 PROCEDURE — 4040F PNEUMOC VAC/ADMIN/RCVD: CPT | Performed by: PODIATRIST

## 2020-12-07 PROCEDURE — 1123F ACP DISCUSS/DSCN MKR DOCD: CPT | Performed by: PODIATRIST

## 2020-12-07 PROCEDURE — G8427 DOCREV CUR MEDS BY ELIG CLIN: HCPCS | Performed by: PODIATRIST

## 2020-12-07 PROCEDURE — G8417 CALC BMI ABV UP PARAM F/U: HCPCS | Performed by: PODIATRIST

## 2020-12-07 PROCEDURE — 99213 OFFICE O/P EST LOW 20 MIN: CPT | Performed by: PODIATRIST

## 2020-12-07 PROCEDURE — 1036F TOBACCO NON-USER: CPT | Performed by: PODIATRIST

## 2020-12-07 PROCEDURE — 1090F PRES/ABSN URINE INCON ASSESS: CPT | Performed by: PODIATRIST

## 2020-12-07 PROCEDURE — G8400 PT W/DXA NO RESULTS DOC: HCPCS | Performed by: PODIATRIST

## 2020-12-07 NOTE — PROGRESS NOTES
[facility]  [address] [place] [address]  Dept: [phone]    DIABETIC PROGRESS NOTE  Date of patient's visit: 12/7/2020  Patient's Name:  Hood Donovan YOB: 1935            Patient Care Team:  Lenoria Fleischer, DO as PCP - Maxwell Terrazas MD as Consulting Physician (Hematology and Oncology)  Sierra Awad MD as Surgeon (General Surgery)  Melony Mckee MD as Physician (Radiation Oncology)          Chief Complaint   Patient presents with    Diabetes    Nail Problem    Peripheral Neuropathy       Subjective:   Hood Donovan comes to clinic for Diabetes; Nail Problem; and Peripheral Neuropathy    she is a diabetic and states that her legs are swollen more then usual.  Pt currently has complaint of thickened, elongated nails that they cannot manage by themselves. Pt's primary care physician is Lenoria Fleischer, DO last seen June 20 2020   Pt's last blood sugar was  Pt states her pcp monitors her blood sugars . Pt has a new complaint of swelling to the right and left leg that is getting worse. Pt relates to being on the feet more and sleeping in a chair     No results found for: LABA1C   Complains of numbness in the feet bilat.   Past Medical History:   Diagnosis Date    Cancer of left breast ([address]     Chronic back pain     Hyperlipidemia     Hypertension     takes supplement     Hypothyroid     Type II or unspecified type diabetes mellitus without mention of complication, not stated as uncontrolled     Wears glasses     reading    Wears partial dentures     upper right tooth       Allergies   Allergen Reactions    Adhesive Tape      Takes skin off    Dust Mite Extract     Molds & Smuts     Pollen Extract     Tetanus Toxoid Other (See Comments)     Current Outpatient Medications on File Prior to Visit   Medication Sig Dispense Refill    Chromium Picolinate 400 MCG TABS Take 500 mcg by mouth      rosuvastatin (CRESTOR) 10 MG tablet rosuvastatin 10 mg tablet   Take 0.5 tablets 3 times a week by oral route.  Proline 500 MG CAPS Take by mouth      Omeprazole (PRILOSEC PO) omeprazole   20mg daily      Aspirin Buf,[id number]-[id number]-MsZyv, (BUFFERED ASPIRIN) 325 MG TABS Take by mouth      TURMERIC PO Take 1,500 mg by mouth      RESVERATROL PO Take 1,200 mg by mouth      OXYGEN 2 L/min      letrozole (FEMARA) 2.5 MG tablet Take 2.5 mg by mouth      HYDROcodone-acetaminophen (NORCO) 5-325 MG per tablet Take 1-2 tablets by mouth .  clotrimazole-betamethasone (LOTRISONE) 1-0.05 % cream Apply topically 2 times daily. 45 g 3    Magnesium Bisglycinate (MAG GLYCINATE) 100 MG TABS Take by mouth      tamsulosin (FLOMAX) 0.4 MG capsule Take 0.4 mg by mouth      QUINAPRIL HCL PO Take by mouth      Histidine HCl IZZY 1 tablet by Does not apply route daily      VITAMIN K PO Take 1 tablet by mouth daily Life extension super vitamin K      Biotin 5000 MCG CAPS Take 1 tablet by mouth daily      Acetylcarnitine HCl 500 MG CAPS Take 1 tablet by mouth daily      NATTOKINASE PO Take by mouth daily Doctor's Best nattokinase 2,000 FU daily      Probiotic Product (PROBIOTIC DAILY PO) Take 1 tablet by mouth daily NOW probiotic      NONFORMULARY Take 200 mg by mouth daily Ubiquinol      Ascorbic Acid (VITAMIN C) 500 MG tablet Take 1,000 mg by mouth 3 times daily Now Vitamin C with Gardenia Hips      Barberry-Oreg Grape-Goldenseal (BERBERINE COMPLEX PO) Take 500 mg by mouth daily      Horse [place] 300 MG CAPS Take 300 mg by mouth daily      Carnosine L POWD Take 500 mg by mouth daily Now L Carnosine      Borage, Borago officinalis, (BORAGE OIL PO) Take 1,000 mg by mouth every other day GLA Borage oil      metFORMIN (GLUCOPHAGE) 1000 MG tablet Take 1,000 mg by mouth 2 times daily      carvedilol (COREG) 12.5 MG tablet Take 12.5 mg by mouth 2 times daily Pt takes 1/2 pill twice a day.  6.25mg twice a day      Glutamine 500 MG CAPS Take 500 mg by mouth 2 times daily      Benfotiamine 150 MG CAPS Take 150 mg by mouth daily Doctor's Best Benfotiamine 150 mg daily      levothyroxine (SYNTHROID) 75 MCG tablet Take 75 mcg by mouth Daily      NONFORMULARY   Take 1 tablet by mouth every other day iodoral every other day      Alpha-Lipoic Acid (LIPOIC ACID PO)   Take 300 mg by mouth nightly       Lysine 1000 MG TABS Take by mouth Lysine 1000mg and proline 500 mg daily      VITAMIN D, CHOLECALCIFEROL, PO Take 6,000 Int'l Units by mouth daily      spironolactone (ALDACTONE) 25 MG tablet Take 25 mg by mouth daily      Omega-3 Fatty Acids (FISH OIL CONCENTRATE PO) Take by mouth daily Fish oil 1 teaspoon daily      cloNIDine (CATAPRES) 0.1 MG tablet   Take 0.1 mg by mouth daily       GLUTATHIONE PO Take 25 mg by mouth daily NOW brand glutathione      Astaxanthin 4 MG CAPS Take 4 mg by mouth daily NOW brand      Arginine 500 MG CAPS Take 500 mg by mouth daily Now brand L-arginine      GLYCINE PO Take 1,000 mg by mouth 2 times daily      RED YEAST RICE EXTRACT PO Take 1,200 mg by mouth 2 times daily      vitamin E 400 UNIT capsule Take 400 Units by mouth daily Mixed tocopherols      vitamin A 28461 UNITS capsule Take 10,000 Units by mouth daily      magnesium citrate solution Take 296 mLs by mouth as needed for Constipation       No current facility-administered medications on file prior to visit. Review of Systems    Review of Systems:   History obtained from chart review and the patient  General ROS: negative for - chills, fatigue, fever, night sweats or weight gain  Constitutional: Negative for chills, diaphoresis, fatigue, fever and unexpected weight change. Musculoskeletal: Positive for arthralgias, gait problem and joint swelling. Neurological ROS: negative for - behavioral changes, confusion, headaches or seizures. Negative for weakness and numbness.    Dermatological ROS: negative for - mole changes, rash  Cardiovascular: Negative for leg swelling. Gastrointestinal: Negative for constipation, diarrhea, nausea and vomiting. Objective:  Dermatologic Exam:  Skin lesion/ulceration Absent . Skin No rashes or nodules noted. .   Skin is thin, with flaky sloughing skin as well as decreased hair growth to the lower leg  Small red hemosiderin deposits seen dorsal foot   Musculoskeletal:     1st MPJ ROM decreased, Bilateral.  Muscle strength 5/5, Bilateral.  Pain present upon palpation of toenails 1-5, Bilateral. decreased medial longitudinal arch, Bilateral.  Ankle ROM decreased,Bilateral.    Dorsally contracted digits present digits 2, Bilateral.     Vascular:     Pitting 2+ edema noted to the right and left foot to the level of the midleg. DP pulses 1/4 bilateral.  PT pulses 0/4 bilateral.   CFT <5 seconds, Bilateral.  Hair growth absent to the level of the digits, Bilateral.  Edema present, Bilateral.  Varicosities absent, Bilateral. Erythema absent, Bilateral    Neurological: Sensation diminshed to light touch to level of digits, Bilateral.  Protective sensation intact 6/10 sites via 5.07/10g [place] Monofilament, Bilateral.  negative Tinel's, Bilateral.  negative Valleix sign, Bilateral.      Integument: Warm, dry, supple, Bilateral.  Open lesion absent, Bilateral.  Interdigital maceration absent to web spaces 4, Bilateral.  Nails 1-5 left and 1-5 right thickened > 3.0 mm, dystrophic and crumbly, discolored with subungual debris. Fissures absent, Bilateral.   General: AAO x 3 in NAD.     Derm  Toenail Description  Sites of Onychomycosis Involvement (Check affected area)  [x] [x] [x] [x] [x] [x] [x] [x] [x] [x]  5 4 3 2 1 1 2 3 4 5                          Right                                        Left    Thickness  [x] [x] [x] [x] [x] [x] [x] [x] [x] [x]  5 4 3 2 1 1 2 3 4 5                         Right                                        Left    Dystrophic Changes [x] [x] [x] [x] [x] [x] [x] [x] [x] [x]  5 4 3 2 1 1 2 3 4 5                         Right                                        Left    Color   [x] [x] [x] [x] [x] [x] [x] [x] [x] [x]  5 4 3 2 1 1 2 3 4 5                          Right                                        Left    Incurvation/Ingrowin   [] [] [] [] [] [] [] [] [] []  5 4 3 2 1 1 2 3 4 5                         Right                                        Left    Inflammation/Pain   [x] [x] [x] [x] [x] [x] [x] [x] [x] [x]  5 4 3 2 1 1 2 3 4 5                         Right                                        Left        Visual inspection:  Deformity: hammertoe deformity federico feet  amputation: absent  Skin lesions: absent  Edema: right- 2+ pitting edema, left- 2+ pitting edema    Sensory exam:  Monofilament sensation: abnormal - 6/10 via SW 5.07/10g monofilament to the plantar foot bilateral feet    Pulses: abnormal - 1/4 dorsalis pedis pulse and 1/4 Posterior tibial pulse,   Pinprick: Impaired  Proprioception: Impaired  Vibration (128 Hz): Impaired       DM with PVD       [x]Yes    []No      Assessment:  82 y.o. female with:   Diagnosis Orders   1. Type II diabetes mellitus with peripheral circulatory disorder ([facility])  27862 - NE DEBRIDEMENT OF NAILS, 6 OR MORE     DIABETES FOOT EXAM   2. Dermatophytosis of nail  75520 - NE DEBRIDEMENT OF NAILS, 6 OR MORE     DIABETES FOOT EXAM   3. PVD (peripheral vascular disease) ([facility])  01944 - NE DEBRIDEMENT OF NAILS, 6 OR MORE    HM DIABETES FOOT EXAM   4. Pain in both feet  64448 - NE DEBRIDEMENT OF NAILS, 6 OR MORE    HM DIABETES FOOT EXAM   5. Edema of lower extremity  HM DIABETES FOOT EXAM           Q7   []Yes    []No                Q8   [x]Yes    []No                     Q9   []Yes    []No    Plan:   Pt was evaluated and examined. Patient was given personalized discharge instructions. For the new leg swelling  Rx given for compression stockings to be worn during the day.   Pt to elevate at night above the heart     Nails 1-10 were debrided sharply in length and thickness with a nipper and , without incident. Pt will follow up in 9 weeks or sooner if any problems arise. Diagnosis was discussed with the pt and all of their questions were answered in detail. Proper foot hygiene and care was discussed with the pt. Informed patient on proper diabetic foot care and importance of tight glycemic control. Patient to check feet daily and contact the office with any questions/problems/concerns. Other comorbidity noted and will be managed by PCP.   12/7/2020    Electronically signed by Jacques Leal DPM on 12/7/2020 at 9:06 AM  12/7/2020

## 2021-02-08 ENCOUNTER — OFFICE VISIT (OUTPATIENT)
Dept: PODIATRY | Age: 86
End: 2021-02-08
Payer: MEDICARE

## 2021-02-08 VITALS — WEIGHT: 146 LBS | HEIGHT: 60 IN | TEMPERATURE: 97.2 F | BODY MASS INDEX: 28.66 KG/M2

## 2021-02-08 DIAGNOSIS — M79.672 PAIN IN BOTH FEET: ICD-10-CM

## 2021-02-08 DIAGNOSIS — E11.51 TYPE II DIABETES MELLITUS WITH PERIPHERAL CIRCULATORY DISORDER (HCC): Primary | ICD-10-CM

## 2021-02-08 DIAGNOSIS — B35.1 DERMATOPHYTOSIS OF NAIL: ICD-10-CM

## 2021-02-08 DIAGNOSIS — M79.671 PAIN IN BOTH FEET: ICD-10-CM

## 2021-02-08 DIAGNOSIS — I73.9 PVD (PERIPHERAL VASCULAR DISEASE) (HCC): ICD-10-CM

## 2021-02-08 PROCEDURE — 11721 DEBRIDE NAIL 6 OR MORE: CPT | Performed by: PODIATRIST

## 2021-02-08 RX ORDER — ASPIRIN 325 MG
TABLET ORAL
COMMUNITY

## 2021-02-08 NOTE — PROGRESS NOTES
[address] [facility] PODIATRY  [address] [facility] [address]  Dept: [phone]    DIABETIC PROGRESS NOTE  Date of patient's visit: 2/8/2021  Patient's Name:  Natacha Whitten YOB: 1935            Patient Care Team:  Ace Pollock DO as PCP - Sravani Beck MD as Consulting Physician (Hematology and Oncology)  Arcadio Bhat MD as Surgeon (General Surgery)  Hector Hilton MD as Physician (Radiation Oncology)          Chief Complaint   Patient presents with    Diabetes    Nail Problem    Peripheral Neuropathy       Subjective:   Natacha Whitten comes to clinic for Diabetes, Nail Problem, and Peripheral Neuropathy    she is a diabetic and states that she checks her feet daily. Pt currently has complaint of thickened, elongated nails that they cannot manage by themselves. Pt's primary care physician is Ace Pollock DO last seen June 20 2020   Pt's last blood sugar was  Patient states she does not check her blood sugars at home. They are monitored by her pcp . Pt has a new complaint of NONE. No results found for: LABA1C   Complains of numbness in the feet bilat. Past Medical History:   Diagnosis Date    Cancer of left breast ([facility] [address]     Chronic back pain     Hyperlipidemia     Hypertension     takes supplement     Hypothyroid     Type II or unspecified type diabetes mellitus without mention of complication, not stated as uncontrolled     Wears glasses     reading    Wears partial dentures     upper right tooth       Allergies   Allergen Reactions    Adhesive Tape      Takes skin off    Dust Mite Extract     Molds & Smuts     Pollen Extract     Tetanus Toxoid Other (See Comments)     Current Outpatient Medications on File Prior to Visit   Medication Sig Dispense Refill    aspirin 325 MG tablet aspirin 325 mg tablet   Take 1 tablet every day by oral route.       Chromium Picolinate 400 MCG TABS Take 500 mcg by mouth      rosuvastatin (CRESTOR) 10 MG tablet rosuvastatin 10 mg tablet   Take 0.5 tablets 3 times a week by oral route.  Proline 500 MG CAPS Take by mouth      Omeprazole (PRILOSEC PO) omeprazole   20mg daily      Aspirin Buf,[id number], (BUFFERED ASPIRIN) 325 MG TABS Take by mouth      TURMERIC PO Take 1,500 mg by mouth      RESVERATROL PO Take 1,200 mg by mouth      OXYGEN 2 L/min      letrozole (FEMARA) 2.5 MG tablet Take 2.5 mg by mouth      HYDROcodone-acetaminophen (NORCO) 5-325 MG per tablet Take 1-2 tablets by mouth .  clotrimazole-betamethasone (LOTRISONE) 1-0.05 % cream Apply topically 2 times daily. 45 g 3    Magnesium Bisglycinate (MAG GLYCINATE) 100 MG TABS Take by mouth      tamsulosin (FLOMAX) 0.4 MG capsule Take 0.4 mg by mouth      QUINAPRIL HCL PO Take by mouth      Histidine HCl IZZY 1 tablet by Does not apply route daily      VITAMIN K PO Take 1 tablet by mouth daily Life extension super vitamin K      Biotin 5000 MCG CAPS Take 1 tablet by mouth daily      Acetylcarnitine HCl 500 MG CAPS Take 1 tablet by mouth daily      NATTOKINASE PO Take by mouth daily Doctor's Best nattokinase 2,000 FU daily      Probiotic Product (PROBIOTIC DAILY PO) Take 1 tablet by mouth daily NOW probiotic      NONFORMULARY Take 200 mg by mouth daily Ubiquinol      Ascorbic Acid (VITAMIN C) 500 MG tablet Take 1,000 mg by mouth 3 times daily Now Vitamin C with Gardenia Hips      Barberry-Oreg Grape-Goldenseal (BERBERINE COMPLEX PO) Take 500 mg by mouth daily      Horse [place] 300 MG CAPS Take 300 mg by mouth daily      Carnosine L POWD Take 500 mg by mouth daily Now L Carnosine      Borage, Borago officinalis, (BORAGE OIL PO) Take 1,000 mg by mouth every other day GLA Borage oil      metFORMIN (GLUCOPHAGE) 1000 MG tablet Take 1,000 mg by mouth 2 times daily      carvedilol (COREG) 12.5 MG tablet Take 12.5 mg by mouth 2 times daily Pt takes 1/2 pill twice a day.  6.25mg twice a day      Glutamine 500 MG CAPS Take 500 mg by mouth 2 times daily      Benfotiamine 150 MG CAPS Take 150 mg by mouth daily Doctor's Best Benfotiamine 150 mg daily      levothyroxine (SYNTHROID) 75 MCG tablet Take 75 mcg by mouth Daily      NONFORMULARY   Take 1 tablet by mouth every other day iodoral every other day      Alpha-Lipoic Acid (LIPOIC ACID PO)   Take 300 mg by mouth nightly       Lysine 1000 MG TABS Take by mouth Lysine 1000mg and proline 500 mg daily      VITAMIN D, CHOLECALCIFEROL, PO Take 6,000 Int'l Units by mouth daily      spironolactone (ALDACTONE) 25 MG tablet Take 25 mg by mouth daily      Omega-3 Fatty Acids (FISH OIL CONCENTRATE PO) Take by mouth daily Fish oil 1 teaspoon daily      cloNIDine (CATAPRES) 0.1 MG tablet   Take 0.1 mg by mouth daily       GLUTATHIONE PO Take 25 mg by mouth daily NOW brand glutathione      Astaxanthin 4 MG CAPS Take 4 mg by mouth daily NOW brand      Arginine 500 MG CAPS Take 500 mg by mouth daily Now brand L-arginine      GLYCINE PO Take 1,000 mg by mouth 2 times daily      RED YEAST RICE EXTRACT PO Take 1,200 mg by mouth 2 times daily      vitamin E 400 UNIT capsule Take 400 Units by mouth daily Mixed tocopherols      vitamin A 57483 UNITS capsule Take 10,000 Units by mouth daily      magnesium citrate solution Take 296 mLs by mouth as needed for Constipation       No current facility-administered medications on file prior to visit. Review of Systems    Review of Systems:   History obtained from chart review and the patient  General ROS: negative for - chills, fatigue, fever, night sweats or weight gain  Constitutional: Negative for chills, diaphoresis, fatigue, fever and unexpected weight change. Musculoskeletal: Positive for arthralgias, gait problem and joint swelling. Neurological ROS: negative for - behavioral changes, confusion, headaches or seizures. Negative for weakness and numbness.    Dermatological ROS: negative for - mole changes, Left    Color   [x] [x] [x] [x] [x] [x] [x] [x] [x] [x]  5 4 3 2 1 1 2 3 4 5                          Right                                        Left    Incurvation/Ingrowin   [] [] [] [] [] [] [] [] [] []  5 4 3 2 1 1 2 3 4 5                         Right                                        Left    Inflammation/Pain   [x] [x] [x] [x] [x] [x] [x] [x] [x] [x]  5 4 3 2 1 1 2 3 4 5                         Right                                        Left        Visual inspection:  Deformity: hammertoe deformity federico feet  amputation: absent  Skin lesions: absent  Edema: right- 2+ pitting edema, left- 2+ pitting edema    Sensory exam:  Monofilament sensation: abnormal - 6/10 via SW 5.07/10g monofilament to the plantar foot bilateral feet    Pulses: abnormal - 1/4 dorsalis pedis pulse and 1/4 Posterior tibial pulse,   Pinprick: Impaired  Proprioception: Impaired  Vibration (128 Hz): Impaired       DM with PVD       [x]Yes    []No      Assessment:  82 y.o. female with:   Diagnosis Orders   1. Type II diabetes mellitus with peripheral circulatory disorder ([facility])  [address] - MO DEBRIDEMENT OF NAILS, 6 OR MORE    HM DIABETES FOOT EXAM   2. Dermatophytosis of nail  12603 - MO DEBRIDEMENT OF NAILS, 6 OR MORE    HM DIABETES FOOT EXAM   3. PVD (peripheral vascular disease) ([facility])  [address]7422 - MO DEBRIDEMENT OF NAILS, 6 OR MORE    HM DIABETES FOOT EXAM   4. Pain in both feet  70328 - MO DEBRIDEMENT OF NAILS, 6 OR MORE    HM DIABETES FOOT EXAM           Q7   []Yes    []No                Q8   [x]Yes    []No                     Q9   []Yes    []No    Plan:   Pt was evaluated and examined. Patient was given personalized discharge instructions. Nails 1-10 were debrided sharply in length and thickness with a nipper and , without incident. Pt will follow up in 9 weeks or sooner if any problems arise. Diagnosis was discussed with the pt and all of their questions were answered in detail.  Proper foot hygiene and care was discussed with the pt. Informed patient on proper diabetic foot care and importance of tight glycemic control. Patient to check feet daily and contact the office with any questions/problems/concerns.    Other comorbidity noted and will be managed by PCP.  2/8/2021    Electronically signed by Sailaja Maria DPM on 2/8/2021 at 8:58 AM  2/8/2021

## 2021-04-14 ENCOUNTER — OFFICE VISIT (OUTPATIENT)
Dept: PODIATRY | Age: 86
End: 2021-04-14
Payer: MEDICARE

## 2021-04-14 DIAGNOSIS — B35.1 DERMATOPHYTOSIS OF NAIL: ICD-10-CM

## 2021-04-14 DIAGNOSIS — M79.672 PAIN IN BOTH FEET: ICD-10-CM

## 2021-04-14 DIAGNOSIS — M79.671 PAIN IN BOTH FEET: ICD-10-CM

## 2021-04-14 DIAGNOSIS — E11.51 TYPE II DIABETES MELLITUS WITH PERIPHERAL CIRCULATORY DISORDER (HCC): Primary | ICD-10-CM

## 2021-04-14 DIAGNOSIS — I73.9 PVD (PERIPHERAL VASCULAR DISEASE) (HCC): ICD-10-CM

## 2021-04-14 PROCEDURE — 11721 DEBRIDE NAIL 6 OR MORE: CPT | Performed by: PODIATRIST

## 2021-04-14 NOTE — PROGRESS NOTES
[address] [place] [address] PODIATRY  [address] [facility]  Dept: [phone]    DIABETIC PROGRESS NOTE  Date of patient's visit: 4/14/2021  Patient's Name:  Joycelyn Baker YOB: 1935            Patient Care Team:  Ame Shannon DO as PCP - Heber Batista MD as Consulting Physician (Hematology and Oncology)  Sandra Amos MD as Surgeon (General Surgery)  Juventino Leon MD as Physician (Radiation Oncology)          Chief Complaint   Patient presents with    Diabetes    Nail Problem    Peripheral Neuropathy       Subjective:   Joycelyn Baker comes to clinic for Diabetes, Nail Problem, and Peripheral Neuropathy    she is a diabetic and states that she checks her feet daily . Pt currently has complaint of thickened, elongated nails that they cannot manage by themselves. Pt's primary care physician is Ame Shannon DO last seen June 20 2020   Pt's last blood sugar was patients pcp monitors her blood sugars . Pt has a new complaint of ONE. No results found for: LABA1C   Complains of numbness in the feet bilat. Past Medical History:   Diagnosis Date    Cancer of left breast ([address]     Chronic back pain     Hyperlipidemia     Hypertension     takes supplement     Hypothyroid     Type II or unspecified type diabetes mellitus without mention of complication, not stated as uncontrolled     Wears glasses     reading    Wears partial dentures     upper right tooth       Allergies   Allergen Reactions    Adhesive Tape      Takes skin off    Dust Mite Extract     Molds & Smuts     Pollen Extract     Tetanus Toxoid Other (See Comments)     Current Outpatient Medications on File Prior to Visit   Medication Sig Dispense Refill    aspirin 325 MG tablet aspirin 325 mg tablet   Take 1 tablet every day by oral route.       Chromium Picolinate 400 MCG TABS Take 500 mcg by mouth      rosuvastatin (CRESTOR) 10 MG tablet rosuvastatin 10 mg tablet   Take 0.5 tablets 3 times a week by oral route.  Proline 500 MG CAPS Take by mouth      Omeprazole (PRILOSEC PO) omeprazole   20mg daily      Aspirin Buf,[id number], (BUFFERED ASPIRIN) 325 MG TABS Take by mouth      TURMERIC PO Take 1,500 mg by mouth      RESVERATROL PO Take 1,200 mg by mouth      OXYGEN 2 L/min      letrozole (FEMARA) 2.5 MG tablet Take 2.5 mg by mouth      HYDROcodone-acetaminophen (NORCO) 5-325 MG per tablet Take 1-2 tablets by mouth .  clotrimazole-betamethasone (LOTRISONE) 1-0.05 % cream Apply topically 2 times daily. 45 g 3    Magnesium Bisglycinate (MAG GLYCINATE) 100 MG TABS Take by mouth      tamsulosin (FLOMAX) 0.4 MG capsule Take 0.4 mg by mouth      QUINAPRIL HCL PO Take by mouth      Histidine HCl IZZY 1 tablet by Does not apply route daily      VITAMIN K PO Take 1 tablet by mouth daily Life extension super vitamin K      Biotin 5000 MCG CAPS Take 1 tablet by mouth daily      Acetylcarnitine HCl 500 MG CAPS Take 1 tablet by mouth daily      NATTOKINASE PO Take by mouth daily Doctor's Best nattokinase 2,000 FU daily      Probiotic Product (PROBIOTIC DAILY PO) Take 1 tablet by mouth daily NOW probiotic      NONFORMULARY Take 200 mg by mouth daily Ubiquinol      Ascorbic Acid (VITAMIN C) 500 MG tablet Take 1,000 mg by mouth 3 times daily Now Vitamin C with Gardenia Hips      Barberry-Oreg Grape-Goldenseal (BERBERINE COMPLEX PO) Take 500 mg by mouth daily      Horse [place] 300 MG CAPS Take 300 mg by mouth daily      Carnosine L POWD Take 500 mg by mouth daily Now L Carnosine      Borage, Borago officinalis, (BORAGE OIL PO) Take 1,000 mg by mouth every other day GLA Borage oil      metFORMIN (GLUCOPHAGE) 1000 MG tablet Take 1,000 mg by mouth 2 times daily      carvedilol (COREG) 12.5 MG tablet Take 12.5 mg by mouth 2 times daily Pt takes 1/2 pill twice a day.  6.25mg twice a day      Glutamine 500 MG CAPS Take 500 mg by mouth 2 times daily      Benfotiamine 150 MG CAPS Take 150 mg by mouth daily Doctor's Best Benfotiamine 150 mg daily      levothyroxine (SYNTHROID) 75 MCG tablet Take 75 mcg by mouth Daily      NONFORMULARY   Take 1 tablet by mouth every other day iodoral every other day      Alpha-Lipoic Acid (LIPOIC ACID PO)   Take 300 mg by mouth nightly       Lysine 1000 MG TABS Take by mouth Lysine 1000mg and proline 500 mg daily      VITAMIN D, CHOLECALCIFEROL, PO Take 6,000 Int'l Units by mouth daily      spironolactone (ALDACTONE) 25 MG tablet Take 25 mg by mouth daily      Omega-3 Fatty Acids (FISH OIL CONCENTRATE PO) Take by mouth daily Fish oil 1 teaspoon daily      cloNIDine (CATAPRES) 0.1 MG tablet   Take 0.1 mg by mouth daily       GLUTATHIONE PO Take 25 mg by mouth daily NOW brand glutathione      Astaxanthin 4 MG CAPS Take 4 mg by mouth daily NOW brand      Arginine 500 MG CAPS Take 500 mg by mouth daily Now brand L-arginine      GLYCINE PO Take 1,000 mg by mouth 2 times daily      RED YEAST RICE EXTRACT PO Take 1,200 mg by mouth 2 times daily      vitamin E 400 UNIT capsule Take 400 Units by mouth daily Mixed tocopherols      vitamin A 46867 UNITS capsule Take 10,000 Units by mouth daily      magnesium citrate solution Take 296 mLs by mouth as needed for Constipation       No current facility-administered medications on file prior to visit. Review of Systems    Review of Systems:   History obtained from chart review and the patient  General ROS: negative for - chills, fatigue, fever, night sweats or weight gain  Constitutional: Negative for chills, diaphoresis, fatigue, fever and unexpected weight change. Musculoskeletal: Positive for arthralgias, gait problem and joint swelling. Neurological ROS: negative for - behavioral changes, confusion, headaches or seizures. Negative for weakness and numbness. Dermatological ROS: negative for - mole changes, rash  Cardiovascular: Negative for leg swelling. Gastrointestinal: Negative for constipation, diarrhea, nausea and vomiting. Objective:  Dermatologic Exam:  Skin lesion/ulceration Absent . Skin No rashes or nodules noted. .   Skin is thin, with flaky sloughing skin as well as decreased hair growth to the lower leg  Small red hemosiderin deposits seen dorsal foot   Musculoskeletal:     1st MPJ ROM decreased, Bilateral.  Muscle strength 5/5, Bilateral.  Pain present upon palpation of toenails 1-5, Bilateral. decreased medial longitudinal arch, Bilateral.  Ankle ROM decreased,Bilateral.    Dorsally contracted digits present digits 2, Bilateral.     Vascular: DP pulses 1/4 bilateral.  PT pulses 0/4 bilateral.   CFT <5 seconds, Bilateral.  Hair growth absent to the level of the digits, Bilateral.  Edema present, Bilateral.  Varicosities absent, Bilateral. Erythema absent, Bilateral    Neurological: Sensation diminshed to light touch to level of digits, Bilateral.  Protective sensation intact 6/10 sites via 5.07/10g [place] Monofilament, Bilateral.  negative Tinel's, Bilateral.  negative Valleix sign, Bilateral.      Integument: Warm, dry, supple, Bilateral.  Open lesion absent, Bilateral.  Interdigital maceration absent to web spaces 4, Bilateral.  Nails 1-5 left and 1-5 right thickened > 3.0 mm, dystrophic and crumbly, discolored with subungual debris. Fissures absent, Bilateral.   General: AAO x 3 in NAD.     Derm  Toenail Description  Sites of Onychomycosis Involvement (Check affected area)  [x] [x] [x] [x] [x] [x] [x] [x] [x] [x]  5 4 3 2 1 1 2 3 4 5                          Right                                        Left    Thickness  [x] [x] [x] [x] [x] [x] [x] [x] [x] [x]  5 4 3 2 1 1 2 3 4 5                         Right                                        Left    Dystrophic Changes   [x] [x] [x] [x] [x] [x] [x] [x] [x] [x]  5 4 3 2 1 1 2 3 4 5                         Right                                        Left    Color [x] [x] [x] [x] [x] [x] [x] [x] [x] [x]  5 4 3 2 1 1 2 3 4 5                          Right                                        Left    Incurvation/Ingrowin   [] [] [] [] [] [] [] [] [] []  5 4 3 2 1 1 2 3 4 5                         Right                                        Left    Inflammation/Pain   [x] [x] [x] [x] [x] [x] [x] [x] [x] [x]  5 4 3 2 1 1 2 3 4 5                         Right                                        Left        Visual inspection:  Deformity: hammertoe deformity federico feet  amputation: absent  Skin lesions: absent  Edema: right- 2+ pitting edema, left- 2+ pitting edema    Sensory exam:  Monofilament sensation: abnormal - 6/10 via SW 5.07/10g monofilament to the plantar foot bilateral feet    Pulses: abnormal - 1/4 dorsalis pedis pulse and 1/4 Posterior tibial pulse,   Pinprick: Impaired  Proprioception: Impaired  Vibration (128 Hz): Impaired       DM with PVD       [x]Yes    []No      Assessment:  82 y.o. female with:   Diagnosis Orders   1. Type II diabetes mellitus with peripheral circulatory disorder ([facility])  [address]38 - [place] DEBRIDEMENT OF NAILS, 6 OR MORE    HM DIABETES FOOT EXAM   2. Dermatophytosis of nail  29627 - CO DEBRIDEMENT OF NAILS, 6 OR MORE    HM DIABETES FOOT EXAM   3. PVD (peripheral vascular disease) ([facility])  06352 - CO DEBRIDEMENT OF NAILS, 6 OR MORE    HM DIABETES FOOT EXAM   4. Pain in both feet  77179 - CO DEBRIDEMENT OF NAILS, 6 OR MORE    HM DIABETES FOOT EXAM           Q7   []Yes    []No                Q8   [x]Yes    []No                     Q9   []Yes    []No    Plan:   Pt was evaluated and examined. Patient was given personalized discharge instructions. Nails 1-10 were debrided sharply in length and thickness with a nipper and , without incident. Pt will follow up in 9 weeks or sooner if any problems arise. Diagnosis was discussed with the pt and all of their questions were answered in detail. Proper foot hygiene and care was discussed with the pt.  Informed patient on proper diabetic foot care and importance of tight glycemic control. Patient to check feet daily and contact the office with any questions/problems/concerns.    Other comorbidity noted and will be managed by PCP.  4/14/2021    Electronically signed by Maria Isabel Rossi DPM on 4/14/2021 at 9:14 AM  4/14/2021

## 2021-06-16 ENCOUNTER — OFFICE VISIT (OUTPATIENT)
Dept: PODIATRY | Age: 86
End: 2021-06-16
Payer: MEDICARE

## 2021-06-16 VITALS — HEIGHT: 60 IN | WEIGHT: 146 LBS | BODY MASS INDEX: 28.66 KG/M2

## 2021-06-16 DIAGNOSIS — M79.671 PAIN IN BOTH FEET: ICD-10-CM

## 2021-06-16 DIAGNOSIS — B35.1 DERMATOPHYTOSIS OF NAIL: ICD-10-CM

## 2021-06-16 DIAGNOSIS — I73.9 PVD (PERIPHERAL VASCULAR DISEASE) (HCC): ICD-10-CM

## 2021-06-16 DIAGNOSIS — E11.51 TYPE II DIABETES MELLITUS WITH PERIPHERAL CIRCULATORY DISORDER (HCC): Primary | ICD-10-CM

## 2021-06-16 DIAGNOSIS — M79.672 PAIN IN BOTH FEET: ICD-10-CM

## 2021-06-16 PROCEDURE — 11721 DEBRIDE NAIL 6 OR MORE: CPT | Performed by: PODIATRIST

## 2021-06-16 NOTE — PROGRESS NOTES
[address] [facility] PODIATRY  [address] [address]  Dept: [phone]    DIABETIC PROGRESS NOTE  Date of patient's visit: 6/16/2021  Patient's Name:  Himanshu Payan YOB: 1935            Patient Care Team:  Keri Murray DO as PCP - Eleazar Suarez MD as Consulting Physician (Hematology and Oncology)  Kobe Hernandez MD as Surgeon (General Surgery)  Luiz Medel MD as Physician (Radiation Oncology)          Chief Complaint   Patient presents with    Diabetes    Nail Problem    Peripheral Neuropathy       Subjective:   Himanshu Payan comes to clinic for Diabetes, Nail Problem, and Peripheral Neuropathy    she is a diabetic and states that she checks her feet daily. .  Pt currently has complaint of thickened, elongated nails that they cannot manage by themselves. Pt's primary care physician is Keri Murray DO last seen June 20 2020   Pt's last blood sugar was patients pcp monitors blood sugars . Pt has a new complaint of none. Tomorrow is her B day. No results found for: LABA1C   Complains of numbness in the feet bilat. Past Medical History:   Diagnosis Date    Cancer of left breast ([facility] [address]     Chronic back pain     Hyperlipidemia     Hypertension     takes supplement     Hypothyroid     Type II or unspecified type diabetes mellitus without mention of complication, not stated as uncontrolled     Wears glasses     reading    Wears partial dentures     upper right tooth       Allergies   Allergen Reactions    Adhesive Tape      Takes skin off    Dust Mite Extract     Molds & Smuts     Pollen Extract     Tetanus Toxoid Other (See Comments)     Current Outpatient Medications on File Prior to Visit   Medication Sig Dispense Refill    aspirin 325 MG tablet aspirin 325 mg tablet   Take 1 tablet every day by oral route.       Chromium Picolinate 400 MCG TABS Take 500 mcg by mouth      rosuvastatin (CRESTOR) 10 MG tablet wound check 2 times daily      Benfotiamine 150 MG CAPS Take 150 mg by mouth daily Doctor's Best Benfotiamine 150 mg daily      levothyroxine (SYNTHROID) 75 MCG tablet Take 75 mcg by mouth Daily      NONFORMULARY   Take 1 tablet by mouth every other day iodoral every other day      Alpha-Lipoic Acid (LIPOIC ACID PO)   Take 300 mg by mouth nightly       Lysine 1000 MG TABS Take by mouth Lysine 1000mg and proline 500 mg daily      VITAMIN D, CHOLECALCIFEROL, PO Take 6,000 Int'l Units by mouth daily      spironolactone (ALDACTONE) 25 MG tablet Take 25 mg by mouth daily      Omega-3 Fatty Acids (FISH OIL CONCENTRATE PO) Take by mouth daily Fish oil 1 teaspoon daily      cloNIDine (CATAPRES) 0.1 MG tablet   Take 0.1 mg by mouth daily       GLUTATHIONE PO Take 25 mg by mouth daily NOW brand glutathione      Astaxanthin 4 MG CAPS Take 4 mg by mouth daily NOW brand      Arginine 500 MG CAPS Take 500 mg by mouth daily Now brand L-arginine      GLYCINE PO Take 1,000 mg by mouth 2 times daily      RED YEAST RICE EXTRACT PO Take 1,200 mg by mouth 2 times daily      vitamin E 400 UNIT capsule Take 400 Units by mouth daily Mixed tocopherols      vitamin A 49686 UNITS capsule Take 10,000 Units by mouth daily      magnesium citrate solution Take 296 mLs by mouth as needed for Constipation       No current facility-administered medications on file prior to visit. Review of Systems    Review of Systems:   History obtained from chart review and the patient  General ROS: negative for - chills, fatigue, fever, night sweats or weight gain  Constitutional: Negative for chills, diaphoresis, fatigue, fever and unexpected weight change. Musculoskeletal: Positive for arthralgias, gait problem and joint swelling. Neurological ROS: negative for - behavioral changes, confusion, headaches or seizures. Negative for weakness and numbness.    Dermatological ROS: negative for - mole changes, rash  Cardiovascular: Negative for leg swelling. Gastrointestinal: Negative for constipation, diarrhea, nausea and vomiting. Objective:  Dermatologic Exam:  Skin lesion/ulceration Absent . Skin No rashes or nodules noted. .   Skin is thin, with flaky sloughing skin as well as decreased hair growth to the lower leg  Small red hemosiderin deposits seen dorsal foot   Musculoskeletal:     1st MPJ ROM decreased, Bilateral.  Muscle strength 5/5, Bilateral.  Pain present upon palpation of toenails 1-5, Bilateral. decreased medial longitudinal arch, Bilateral.  Ankle ROM decreased,Bilateral.    Dorsally contracted digits present digits 2, Bilateral.     Vascular: DP pulses 1/4 bilateral.  PT pulses 0/4 bilateral.   CFT <5 seconds, Bilateral.  Hair growth absent to the level of the digits, Bilateral.  Edema present, Bilateral.  Varicosities absent, Bilateral. Erythema absent, Bilateral    Neurological: Sensation diminshed to light touch to level of digits, Bilateral.  Protective sensation intact 6/10 sites via 5.07/10g [place]-Mil Monofilament, Bilateral.  negative Tinel's, Bilateral.  negative Valleix sign, Bilateral.      Integument: Warm, dry, supple, Bilateral.  Open lesion absent, Bilateral.  Interdigital maceration absent to web spaces 4, Bilateral.  Nails 1-5 left and 1-5 right thickened > 3.0 mm, dystrophic and crumbly, discolored with subungual debris. Fissures absent, Bilateral.   General: AAO x 3 in NAD.     Derm  Toenail Description  Sites of Onychomycosis Involvement (Check affected area)  [x] [x] [x] [x] [x] [x] [x] [x] [x] [x]  5 4 3 2 1 1 2 3 4 5                          Right                                        Left    Thickness  [x] [x] [x] [x] [x] [x] [x] [x] [x] [x]  5 4 3 2 1 1 2 3 4 5                         Right                                        Left    Dystrophic Changes   [x] [x] [x] [x] [x] [x] [x] [x] [x] [x]  5 4 3 2 1 1 2 3 4 5                         Right                                        Left    Color [x] [x] [x] [x] [x] [x] [x] [x] [x] [x]  5 4 3 2 1 1 2 3 4 5                          Right                                        Left    Incurvation/Ingrowin   [] [] [] [] [] [] [] [] [] []  5 4 3 2 1 1 2 3 4 5                         Right                                        Left    Inflammation/Pain   [x] [x] [x] [x] [x] [x] [x] [x] [x] [x]  5 4 3 2 1 1 2 3 4 5                         Right                                        Left        Visual inspection:  Deformity: hammertoe deformity federico feet  amputation: absent  Skin lesions: absent  Edema: right- 2+ pitting edema, left- 2+ pitting edema    Sensory exam:  Monofilament sensation: abnormal - 6/10 via SW 5.07/10g monofilament to the plantar foot bilateral feet    Pulses: abnormal - 1/4 dorsalis pedis pulse and 1/4 Posterior tibial pulse,   Pinprick: Impaired  Proprioception: Impaired  Vibration (128 Hz): Impaired       DM with PVD       [x]Yes    []No      Assessment:  82 y.o. female with:   Diagnosis Orders   1. Type II diabetes mellitus with peripheral circulatory disorder ([facility])  [address] - [place] DEBRIDEMENT OF NAILS, 6 OR MORE    HM DIABETES FOOT EXAM   2. Dermatophytosis of nail  42118 - [place] DEBRIDEMENT OF NAILS, 6 OR MORE    HM DIABETES FOOT EXAM   3. PVD (peripheral vascular disease) ([facility])  [address] - [place] DEBRIDEMENT OF NAILS, 6 OR MORE    HM DIABETES FOOT EXAM   4. Pain in both feet  [address]20 - [place] DEBRIDEMENT OF NAILS, 6 OR MORE    HM DIABETES FOOT EXAM           Q7   []Yes    []No                Q8   [x]Yes    []No                     Q9   []Yes    []No    Plan:   Pt was evaluated and examined. Patient was given personalized discharge instructions. Nails 1-10 were debrided sharply in length and thickness with a nipper and , without incident. Pt will follow up in 9 weeks or sooner if any problems arise. Diagnosis was discussed with the pt and all of their questions were answered in detail. Proper foot hygiene and care was discussed with the pt.  Informed patient on proper diabetic foot care and importance of tight glycemic control. Patient to check feet daily and contact the office with any questions/problems/concerns.    Other comorbidity noted and will be managed by PCP.  6/16/2021    Electronically signed by Kristin Bridges DPM on 6/16/2021 at 9:30 AM  6/16/2021

## 2021-08-18 ENCOUNTER — OFFICE VISIT (OUTPATIENT)
Dept: PODIATRY | Age: 86
End: 2021-08-18
Payer: MEDICARE

## 2021-08-18 VITALS — WEIGHT: 146 LBS | BODY MASS INDEX: 28.66 KG/M2 | HEIGHT: 60 IN

## 2021-08-18 DIAGNOSIS — M79.672 PAIN IN BOTH FEET: ICD-10-CM

## 2021-08-18 DIAGNOSIS — M79.671 PAIN IN BOTH FEET: ICD-10-CM

## 2021-08-18 DIAGNOSIS — B35.1 DERMATOPHYTOSIS OF NAIL: ICD-10-CM

## 2021-08-18 DIAGNOSIS — E11.51 TYPE II DIABETES MELLITUS WITH PERIPHERAL CIRCULATORY DISORDER (HCC): Primary | ICD-10-CM

## 2021-08-18 DIAGNOSIS — I73.9 PVD (PERIPHERAL VASCULAR DISEASE) (HCC): ICD-10-CM

## 2021-08-18 PROCEDURE — 11721 DEBRIDE NAIL 6 OR MORE: CPT | Performed by: PODIATRIST

## 2021-08-18 NOTE — PROGRESS NOTES
[address] [facility] PODIATRY  [address] [facility] [address]  Dept: [phone]    DIABETIC PROGRESS NOTE  Date of patient's visit: 8/18/2021  Patient's Name:  Burgess Olvera YOB: 1935            Patient Care Team:  Cy Villaseñor DO as PCP - Alvarez Sarabia MD as Consulting Physician (Hematology and Oncology)  Christina Delgado MD as Surgeon (General Surgery)  Bill Monge MD as Physician (Radiation Oncology)          Chief Complaint   Patient presents with    Diabetes    Nail Problem    Peripheral Neuropathy       Subjective:   Burgess Olvera comes to clinic for Diabetes, Nail Problem, and Peripheral Neuropathy    she is a diabetic and states that she checks her feet and uses lotion when she remembers. Pt currently has complaint of thickened, elongated nails that they cannot manage by themselves. Pt's primary care physician is Cy Villaseñor DO last seen July 6 2021  Pt's last blood sugar was patients pcp monitors blood sugar . Pt has a new complaint of none today . No results found for: LABA1C   Complains of numbness in the feet bilat. Past Medical History:   Diagnosis Date    [facility] left breast ([facility] [address]     Chronic back pain     Hyperlipidemia     Hypertension     takes supplement     Hypothyroid     Type II or unspecified type diabetes mellitus without mention of complication, not stated as uncontrolled     Wears glasses     reading    Wears partial dentures     upper right tooth       Allergies   Allergen Reactions    Adhesive Tape      Takes skin off    Dust Mite Extract     Molds & Smuts     Pollen Extract     Tetanus Toxoid Other (See Comments)     Current Outpatient Medications on File Prior to Visit   Medication Sig Dispense Refill    aspirin 325 MG tablet aspirin 325 mg tablet   Take 1 tablet every day by oral route.       Chromium Picolinate 400 MCG TABS Take 500 mcg by mouth      rosuvastatin (CRESTOR) 10 MG tablet rosuvastatin 10 mg tablet   Take 0.5 tablets 3 times a week by oral route.  Proline 500 MG CAPS Take by mouth      Omeprazole (PRILOSEC PO) omeprazole   20mg daily      Aspirin Buf,GfFyn-GjSbe-BjDgo, (BUFFERED ASPIRIN) 325 MG TABS Take by mouth      TURMERIC PO Take 1,500 mg by mouth      RESVERATROL PO Take 1,200 mg by mouth      OXYGEN 2 L/min      letrozole (FEMARA) 2.5 MG tablet Take 2.5 mg by mouth      HYDROcodone-acetaminophen (NORCO) 5-325 MG per tablet Take 1-2 tablets by mouth .  clotrimazole-betamethasone (LOTRISONE) 1-0.05 % cream Apply topically 2 times daily. 45 g 3    Magnesium Bisglycinate (MAG GLYCINATE) 100 MG TABS Take by mouth      tamsulosin (FLOMAX) 0.4 MG capsule Take 0.4 mg by mouth      QUINAPRIL HCL PO Take by mouth      Histidine HCl IZZY 1 tablet by Does not apply route daily      VITAMIN K PO Take 1 tablet by mouth daily Life extension super vitamin K      Biotin 5000 MCG CAPS Take 1 tablet by mouth daily      Acetylcarnitine HCl 500 MG CAPS Take 1 tablet by mouth daily      NATTOKINASE PO Take by mouth daily Doctor's Best nattokinase 2,000 FU daily      Probiotic Product (PROBIOTIC DAILY PO) Take 1 tablet by mouth daily NOW probiotic      NONFORMULARY Take 200 mg by mouth daily Ubiquinol      Ascorbic Acid (VITAMIN C) 500 MG tablet Take 1,000 mg by mouth 3 times daily Now Vitamin C with Gardenia Hips      Barberry-Oreg Grape-Goldenseal (BERBERINE COMPLEX PO) Take 500 mg by mouth daily      Horse [place] 300 MG CAPS Take 300 mg by mouth daily      Carnosine L POWD Take 500 mg by mouth daily Now L Carnosine      Borage, Borago officinalis, (BORAGE OIL PO) Take 1,000 mg by mouth every other day GLA Borage oil      metFORMIN (GLUCOPHAGE) 1000 MG tablet Take 1,000 mg by mouth 2 times daily      carvedilol (COREG) 12.5 MG tablet Take 12.5 mg by mouth 2 times daily Pt takes 1/2 pill twice a day.  6.25mg twice a day      Glutamine 500 MG CAPS Take 500 mg by mouth 2 times daily      Benfotiamine 150 MG CAPS Take 150 mg by mouth daily Doctor's Best Benfotiamine 150 mg daily      levothyroxine (SYNTHROID) 75 MCG tablet Take 75 mcg by mouth Daily      NONFORMULARY   Take 1 tablet by mouth every other day iodoral every other day      Alpha-Lipoic Acid (LIPOIC ACID PO)   Take 300 mg by mouth nightly       Lysine 1000 MG TABS Take by mouth Lysine 1000mg and proline 500 mg daily      VITAMIN D, CHOLECALCIFEROL, PO Take 6,000 Int'l Units by mouth daily      spironolactone (ALDACTONE) 25 MG tablet Take 25 mg by mouth daily      Omega-3 Fatty Acids (FISH OIL CONCENTRATE PO) Take by mouth daily Fish oil 1 teaspoon daily      cloNIDine (CATAPRES) 0.1 MG tablet   Take 0.1 mg by mouth daily       GLUTATHIONE PO Take 25 mg by mouth daily NOW brand glutathione      Astaxanthin 4 MG CAPS Take 4 mg by mouth daily NOW brand      Arginine 500 MG CAPS Take 500 mg by mouth daily Now brand L-arginine      GLYCINE PO Take 1,000 mg by mouth 2 times daily      RED YEAST RICE EXTRACT PO Take 1,200 mg by mouth 2 times daily      vitamin E 400 UNIT capsule Take 400 Units by mouth daily Mixed tocopherols      vitamin A 24217 UNITS capsule Take 10,000 Units by mouth daily      magnesium citrate solution Take 296 mLs by mouth as needed for Constipation       No current facility-administered medications on file prior to visit. Review of Systems    Review of Systems:   History obtained from chart review and the patient  General ROS: negative for - chills, fatigue, fever, night sweats or weight gain  Constitutional: Negative for chills, diaphoresis, fatigue, fever and unexpected weight change. Musculoskeletal: Positive for arthralgias, gait problem and joint swelling. Neurological ROS: negative for - behavioral changes, confusion, headaches or seizures. Negative for weakness and numbness.    Dermatological ROS: negative for - mole changes, rash  Cardiovascular: Negative for leg swelling. Gastrointestinal: Negative for constipation, diarrhea, nausea and vomiting. Objective:  Dermatologic Exam:  Skin lesion/ulceration Absent . Skin No rashes or nodules noted. .   Skin is thin, with flaky sloughing skin as well as decreased hair growth to the lower leg  Small red hemosiderin deposits seen dorsal foot   Musculoskeletal:     1st MPJ ROM decreased, Bilateral.  Muscle strength 5/5, Bilateral.  Pain present upon palpation of toenails 1-5, Bilateral. decreased medial longitudinal arch, Bilateral.  Ankle ROM decreased,Bilateral.    Dorsally contracted digits present digits 2, Bilateral.     Vascular: DP pulses 1/4 bilateral.  PT pulses 0/4 bilateral.   CFT <5 seconds, Bilateral.  Hair growth absent to the level of the digits, Bilateral.  Edema present, Bilateral.  Varicosities absent, Bilateral. Erythema absent, Bilateral    Neurological: Sensation diminshed to light touch to level of digits, Bilateral.  Protective sensation intact 6/10 sites via 5.07/10g [place]-Mil Monofilament, Bilateral.  negative Tinel's, Bilateral.  negative Valleix sign, Bilateral.      Integument: Warm, dry, supple, Bilateral.  Open lesion absent, Bilateral.  Interdigital maceration absent to web spaces 4, Bilateral.  Nails 1-5 left and 1-5 right thickened > 3.0 mm, dystrophic and crumbly, discolored with subungual debris. Fissures absent, Bilateral.   General: AAO x 3 in NAD.     Derm  Toenail Description  Sites of Onychomycosis Involvement (Check affected area)  [x] [x] [x] [x] [x] [x] [x] [x] [x] [x]  5 4 3 2 1 1 2 3 4 5                          Right                                        Left    Thickness  [x] [x] [x] [x] [x] [x] [x] [x] [x] [x]  5 4 3 2 1 1 2 3 4 5                         Right                                        Left    Dystrophic Changes   [x] [x] [x] [x] [x] [x] [x] [x] [x] [x]  5 4 3 2 1 1 2 3 4 5                         Right                                        Left    Color [x] [x] [x] [x] [x] [x] [x] [x] [x] [x]  5 4 3 2 1 1 2 3 4 5                          Right                                        Left    Incurvation/Ingrowin   [] [] [] [] [] [] [] [] [] []  5 4 3 2 1 1 2 3 4 5                         Right                                        Left    Inflammation/Pain   [x] [x] [x] [x] [x] [x] [x] [x] [x] [x]  5 4 3 2 1 1 2 3 4 5                         Right                                        Left        Visual inspection:  Deformity: hammertoe deformity federico feet  amputation: absent  Skin lesions: absent  Edema: right- 2+ pitting edema, left- 2+ pitting edema    Sensory exam:  Monofilament sensation: abnormal - 6/10 via SW 5.07/10g monofilament to the plantar foot bilateral feet    Pulses: abnormal - 1/4 dorsalis pedis pulse and 1/4 Posterior tibial pulse,   Pinprick: Impaired  Proprioception: Impaired  Vibration (128 Hz): Impaired       DM with PVD       [x]Yes    []No      Assessment:  82 y.o. female with:   Diagnosis Orders   1. Type II diabetes mellitus with peripheral circulatory disorder ([facility])  [address] - [place] DEBRIDEMENT OF NAILS, 6 OR MORE    HM DIABETES FOOT EXAM   2. Dermatophytosis of nail  [address]00 - [place] DEBRIDEMENT OF NAILS, 6 OR MORE    HM DIABETES FOOT EXAM   3. PVD (peripheral vascular disease) ([facility])  [address]25 - [place] DEBRIDEMENT OF NAILS, 6 OR MORE    HM DIABETES FOOT EXAM   4. Pain in both feet  [address]3467 - [place] DEBRIDEMENT OF NAILS, 6 OR MORE    HM DIABETES FOOT EXAM           Q7   []Yes    []No                Q8   [x]Yes    []No                     Q9   []Yes    []No    Plan:   Pt was evaluated and examined. Patient was given personalized discharge instructions. Nails 1-10 were debrided sharply in length and thickness with a nipper and , without incident. Pt will follow up in 9 weeks or sooner if any problems arise. Diagnosis was discussed with the pt and all of their questions were answered in detail. Proper foot hygiene and care was discussed with the pt.  Informed patient on proper diabetic foot care and importance of tight glycemic control. Patient to check feet daily and contact the office with any questions/problems/concerns.    Other comorbidity noted and will be managed by PCP.  8/18/2021    Electronically signed by Sabine Lewis DPM on 8/18/2021 at 8:55 AM  8/18/2021

## 2021-10-20 ENCOUNTER — OFFICE VISIT (OUTPATIENT)
Dept: PODIATRY | Age: 86
End: 2021-10-20
Payer: MEDICARE

## 2021-10-20 VITALS — HEIGHT: 60 IN | BODY MASS INDEX: 28.51 KG/M2

## 2021-10-20 DIAGNOSIS — E11.51 TYPE II DIABETES MELLITUS WITH PERIPHERAL CIRCULATORY DISORDER (HCC): Primary | ICD-10-CM

## 2021-10-20 DIAGNOSIS — M79.672 PAIN IN BOTH FEET: ICD-10-CM

## 2021-10-20 DIAGNOSIS — M79.671 PAIN IN BOTH FEET: ICD-10-CM

## 2021-10-20 DIAGNOSIS — B35.1 DERMATOPHYTOSIS OF NAIL: ICD-10-CM

## 2021-10-20 DIAGNOSIS — I73.9 PVD (PERIPHERAL VASCULAR DISEASE) (HCC): ICD-10-CM

## 2021-10-20 PROCEDURE — 11721 DEBRIDE NAIL 6 OR MORE: CPT | Performed by: PODIATRIST

## 2021-10-20 NOTE — PROGRESS NOTES
a week by oral route.  Proline 500 MG CAPS Take by mouth      Omeprazole (PRILOSEC PO) omeprazole   20mg daily      Aspirin Buf,[id number], (BUFFERED ASPIRIN) 325 MG TABS Take by mouth      TURMERIC PO Take 1,500 mg by mouth      RESVERATROL PO Take 1,200 mg by mouth      OXYGEN 2 L/min      letrozole (FEMARA) 2.5 MG tablet Take 2.5 mg by mouth      HYDROcodone-acetaminophen (NORCO) 5-325 MG per tablet Take 1-2 tablets by mouth .  clotrimazole-betamethasone (LOTRISONE) 1-0.05 % cream Apply topically 2 times daily. 45 g 3    Magnesium Bisglycinate (MAG GLYCINATE) 100 MG TABS Take by mouth      tamsulosin (FLOMAX) 0.4 MG capsule Take 0.4 mg by mouth      QUINAPRIL HCL PO Take by mouth      Histidine HCl IZZY 1 tablet by Does not apply route daily      VITAMIN K PO Take 1 tablet by mouth daily Life extension super vitamin K      Biotin 5000 MCG CAPS Take 1 tablet by mouth daily      Acetylcarnitine HCl 500 MG CAPS Take 1 tablet by mouth daily      NATTOKINASE PO Take by mouth daily Doctor's Best nattokinase 2,000 FU daily      Probiotic Product (PROBIOTIC DAILY PO) Take 1 tablet by mouth daily NOW probiotic      NONFORMULARY Take 200 mg by mouth daily Ubiquinol      Ascorbic Acid (VITAMIN C) 500 MG tablet Take 1,000 mg by mouth 3 times daily Now Vitamin C with Gardenia Hips      Barberry-Oreg Grape-Goldenseal (BERBERINE COMPLEX PO) Take 500 mg by mouth daily      Horse [place] 300 MG CAPS Take 300 mg by mouth daily      Carnosine L POWD Take 500 mg by mouth daily Now L Carnosine      Borage, Borago officinalis, (BORAGE OIL PO) Take 1,000 mg by mouth every other day GLA Borage oil      metFORMIN (GLUCOPHAGE) 1000 MG tablet Take 1,000 mg by mouth 2 times daily      carvedilol (COREG) 12.5 MG tablet Take 12.5 mg by mouth 2 times daily Pt takes 1/2 pill twice a day.  6.25mg twice a day      Glutamine 500 MG CAPS Take 500 mg by mouth 2 times daily      Benfotiamine 150 MG CAPS Take 150 mg by mouth daily Doctor's Best Benfotiamine 150 mg daily      levothyroxine (SYNTHROID) 75 MCG tablet Take 75 mcg by mouth Daily      NONFORMULARY   Take 1 tablet by mouth every other day iodoral every other day      Alpha-Lipoic Acid (LIPOIC ACID PO)   Take 300 mg by mouth nightly       Lysine 1000 MG TABS Take by mouth Lysine 1000mg and proline 500 mg daily      VITAMIN D, CHOLECALCIFEROL, PO Take 6,000 Int'l Units by mouth daily      spironolactone (ALDACTONE) 25 MG tablet Take 25 mg by mouth daily      Omega-3 Fatty Acids (FISH OIL CONCENTRATE PO) Take by mouth daily Fish oil 1 teaspoon daily      cloNIDine (CATAPRES) 0.1 MG tablet   Take 0.1 mg by mouth daily       GLUTATHIONE PO Take 25 mg by mouth daily NOW brand glutathione      Astaxanthin 4 MG CAPS Take 4 mg by mouth daily NOW brand      Arginine 500 MG CAPS Take 500 mg by mouth daily Now brand L-arginine      GLYCINE PO Take 1,000 mg by mouth 2 times daily      RED YEAST RICE EXTRACT PO Take 1,200 mg by mouth 2 times daily      vitamin E 400 UNIT capsule Take 400 Units by mouth daily Mixed tocopherols      vitamin A 15614 UNITS capsule Take 10,000 Units by mouth daily      magnesium citrate solution Take 296 mLs by mouth as needed for Constipation       No current facility-administered medications on file prior to visit. Review of Systems    Review of Systems:   History obtained from chart review and the patient  General ROS: negative for - chills, fatigue, fever, night sweats or weight gain  Constitutional: Negative for chills, diaphoresis, fatigue, fever and unexpected weight change. Musculoskeletal: Positive for arthralgias, gait problem and joint swelling. Neurological ROS: negative for - behavioral changes, confusion, headaches or seizures. Negative for weakness and numbness. Dermatological ROS: negative for - mole changes, rash  Cardiovascular: Negative for leg swelling.    Gastrointestinal: Negative for constipation, diarrhea, nausea and vomiting. Objective:  Dermatologic Exam:  Skin lesion/ulceration Absent . Skin No rashes or nodules noted. .   Skin is thin, with flaky sloughing skin as well as decreased hair growth to the lower leg  Small red hemosiderin deposits seen dorsal foot   Musculoskeletal:     1st MPJ ROM decreased, Bilateral.  Muscle strength 5/5, Bilateral.  Pain present upon palpation of toenails 1-5, Bilateral. decreased medial longitudinal arch, Bilateral.  Ankle ROM decreased,Bilateral.    Dorsally contracted digits present digits 2, Bilateral.     Vascular: DP pulses 1/4 bilateral.  PT pulses 0/4 bilateral.   CFT <5 seconds, Bilateral.  Hair growth absent to the level of the digits, Bilateral.  Edema present, Bilateral.  Varicosities absent, Bilateral. Erythema absent, Bilateral    Neurological: Sensation diminshed to light touch to level of digits, Bilateral.  Protective sensation intact 6/10 sites via 5.07/10g [place]-Mil Monofilament, Bilateral.  negative Tinel's, Bilateral.  negative Valleix sign, Bilateral.      Integument: Warm, dry, supple, Bilateral.  Open lesion absent, Bilateral.  Interdigital maceration absent to web spaces 4, Bilateral.  Nails 1-5 left and 1-5 right thickened > 3.0 mm, dystrophic and crumbly, discolored with subungual debris. Fissures absent, Bilateral.   General: AAO x 3 in NAD.     Derm  Toenail Description  Sites of Onychomycosis Involvement (Check affected area)  [x] [x] [x] [x] [x] [x] [x] [x] [x] [x]  5 4 3 2 1 1 2 3 4 5                          Right                                        Left    Thickness  [x] [x] [x] [x] [x] [x] [x] [x] [x] [x]  5 4 3 2 1 1 2 3 4 5                         Right                                        Left    Dystrophic Changes   [x] [x] [x] [x] [x] [x] [x] [x] [x] [x]  5 4 3 2 1 1 2 3 4 5                         Right                                        Left    Color [x] [x] [x] [x] [x] [x] [x] [x] [x] [x]  5 4 3 2 1 1 2 3 4 5                          Right                                        Left    Incurvation/Ingrowin   [] [] [] [] [] [] [] [] [] []  5 4 3 2 1 1 2 3 4 5                         Right                                        Left    Inflammation/Pain   [x] [x] [x] [x] [x] [x] [x] [x] [x] [x]  5 4 3 2 1 1 2 3 4 5                         Right                                        Left        Visual inspection:  Deformity: hammertoe deformity federico feet  amputation: absent  Skin lesions: absent  Edema: right- 2+ pitting edema, left- 2+ pitting edema    Sensory exam:  Monofilament sensation: abnormal - 6/10 via SW 5.07/10g monofilament to the plantar foot bilateral feet    Pulses: abnormal - 1/4 dorsalis pedis pulse and 0/4 Posterior tibial pulse,   Pinprick: Impaired  Proprioception: Impaired  Vibration (128 Hz): Impaired       DM with PVD       [x]Yes    []No      Assessment:  82 y.o. female with:   Diagnosis Orders   1. Type II diabetes mellitus with peripheral circulatory disorder ([facility])  31870 - DE DEBRIDEMENT OF NAILS, 6 OR MORE    HM DIABETES FOOT EXAM   2. Dermatophytosis of nail  62015 - DE DEBRIDEMENT OF NAILS, 6 OR MORE    HM DIABETES FOOT EXAM   3. PVD (peripheral vascular disease) ([facility])  80501 - DE DEBRIDEMENT OF NAILS, 6 OR MORE    HM DIABETES FOOT EXAM   4. Pain in both feet  86802 - DE DEBRIDEMENT OF NAILS, 6 OR MORE    HM DIABETES FOOT EXAM           Q7   []Yes    []No                Q8   [x]Yes    []No                     Q9   []Yes    []No    Plan:   Pt was evaluated and examined. Patient was given personalized discharge instructions. Nails 1-10 were debrided sharply in length and thickness with a nipper and , without incident. Pt will follow up in 9 weeks or sooner if any problems arise. Diagnosis was discussed with the pt and all of their questions were answered in detail. Proper foot hygiene and care was discussed with the pt.  Informed patient on proper diabetic foot care and importance of tight glycemic control. Patient to check feet daily and contact the office with any questions/problems/concerns. Other comorbidity noted and will be managed by PCP.   10/20/2021    Electronically signed by Keesha Jade DPM on 10/20/2021 at 9:12 AM  10/20/2021

## 2021-12-22 ENCOUNTER — OFFICE VISIT (OUTPATIENT)
Dept: PODIATRY | Age: 86
End: 2021-12-22
Payer: MEDICARE

## 2021-12-22 VITALS — BODY MASS INDEX: 28.66 KG/M2 | RESPIRATION RATE: 16 BRPM | WEIGHT: 146 LBS | HEIGHT: 60 IN

## 2021-12-22 DIAGNOSIS — M79.672 PAIN IN BOTH FEET: ICD-10-CM

## 2021-12-22 DIAGNOSIS — E11.51 TYPE II DIABETES MELLITUS WITH PERIPHERAL CIRCULATORY DISORDER (HCC): Primary | ICD-10-CM

## 2021-12-22 DIAGNOSIS — I73.9 PVD (PERIPHERAL VASCULAR DISEASE) (HCC): ICD-10-CM

## 2021-12-22 DIAGNOSIS — B35.1 DERMATOPHYTOSIS OF NAIL: ICD-10-CM

## 2021-12-22 DIAGNOSIS — M79.671 PAIN IN BOTH FEET: ICD-10-CM

## 2021-12-22 PROCEDURE — 11721 DEBRIDE NAIL 6 OR MORE: CPT | Performed by: PODIATRIST

## 2021-12-22 NOTE — PROGRESS NOTES
rosuvastatin 10 mg tablet   Take 0.5 tablets 3 times a week by oral route.  Proline 500 MG CAPS Take by mouth      Omeprazole (PRILOSEC PO) omeprazole   20mg daily      Aspirin Buf,[id number], (BUFFERED ASPIRIN) 325 MG TABS Take by mouth      TURMERIC PO Take 1,500 mg by mouth      RESVERATROL PO Take 1,200 mg by mouth      OXYGEN 2 L/min      letrozole (FEMARA) 2.5 MG tablet Take 2.5 mg by mouth      HYDROcodone-acetaminophen (NORCO) 5-325 MG per tablet Take 1-2 tablets by mouth .  clotrimazole-betamethasone (LOTRISONE) 1-0.05 % cream Apply topically 2 times daily. 45 g 3    Magnesium Bisglycinate (MAG GLYCINATE) 100 MG TABS Take by mouth      tamsulosin (FLOMAX) 0.4 MG capsule Take 0.4 mg by mouth      QUINAPRIL HCL PO Take by mouth      Histidine HCl IZZY 1 tablet by Does not apply route daily      VITAMIN K PO Take 1 tablet by mouth daily Life extension super vitamin K      Biotin 5000 MCG CAPS Take 1 tablet by mouth daily      Acetylcarnitine HCl 500 MG CAPS Take 1 tablet by mouth daily      NATTOKINASE PO Take by mouth daily Doctor's Best nattokinase 2,000 FU daily      Probiotic Product (PROBIOTIC DAILY PO) Take 1 tablet by mouth daily NOW probiotic      NONFORMULARY Take 200 mg by mouth daily Ubiquinol      Ascorbic Acid (VITAMIN C) 500 MG tablet Take 1,000 mg by mouth 3 times daily Now Vitamin C with Gardenia Hips      Barberry-Oreg Grape-Goldenseal (BERBERINE COMPLEX PO) Take 500 mg by mouth daily      Horse [place] 300 MG CAPS Take 300 mg by mouth daily      Carnosine L POWD Take 500 mg by mouth daily Now L Carnosine      Borage, Borago officinalis, (BORAGE OIL PO) Take 1,000 mg by mouth every other day GLA Borage oil      metFORMIN (GLUCOPHAGE) 1000 MG tablet Take 1,000 mg by mouth 2 times daily      carvedilol (COREG) 12.5 MG tablet Take 12.5 mg by mouth 2 times daily Pt takes 1/2 pill twice a day.  6.25mg twice a day      Glutamine 500 MG CAPS Take 500 mg by mouth 2 times daily      Benfotiamine 150 MG CAPS Take 150 mg by mouth daily Doctor's Best Benfotiamine 150 mg daily      levothyroxine (SYNTHROID) 75 MCG tablet Take 75 mcg by mouth Daily      NONFORMULARY   Take 1 tablet by mouth every other day iodoral every other day      Alpha-Lipoic Acid (LIPOIC ACID PO)   Take 300 mg by mouth nightly       Lysine 1000 MG TABS Take by mouth Lysine 1000mg and proline 500 mg daily      VITAMIN D, CHOLECALCIFEROL, PO Take 6,000 Int'l Units by mouth daily      spironolactone (ALDACTONE) 25 MG tablet Take 25 mg by mouth daily      Omega-3 Fatty Acids (FISH OIL CONCENTRATE PO) Take by mouth daily Fish oil 1 teaspoon daily      cloNIDine (CATAPRES) 0.1 MG tablet   Take 0.1 mg by mouth daily       GLUTATHIONE PO Take 25 mg by mouth daily NOW brand glutathione      Astaxanthin 4 MG CAPS Take 4 mg by mouth daily NOW brand      Arginine 500 MG CAPS Take 500 mg by mouth daily Now brand L-arginine      GLYCINE PO Take 1,000 mg by mouth 2 times daily      RED YEAST RICE EXTRACT PO Take 1,200 mg by mouth 2 times daily      vitamin E 400 UNIT capsule Take 400 Units by mouth daily Mixed tocopherols      vitamin A 97800 UNITS capsule Take 10,000 Units by mouth daily      magnesium citrate solution Take 296 mLs by mouth as needed for Constipation       No current facility-administered medications on file prior to visit. Review of Systems    Review of Systems:   History obtained from chart review and the patient  General ROS: negative for - chills, fatigue, fever, night sweats or weight gain  Constitutional: Negative for chills, diaphoresis, fatigue, fever and unexpected weight change. Musculoskeletal: Positive for arthralgias, gait problem and joint swelling. Neurological ROS: negative for - behavioral changes, confusion, headaches or seizures. Negative for weakness and numbness.    Dermatological ROS: negative for - mole changes, rash  Cardiovascular: Negative for leg swelling. Gastrointestinal: Negative for constipation, diarrhea, nausea and vomiting. Objective:  Dermatologic Exam:  Skin lesion/ulceration Absent . Skin No rashes or nodules noted. .   Skin is thin, with flaky sloughing skin as well as decreased hair growth to the lower leg  Small red hemosiderin deposits seen dorsal foot   Musculoskeletal:     1st MPJ ROM decreased, Bilateral.  Muscle strength 5/5, Bilateral.  Pain present upon palpation of toenails 1-5, Bilateral. decreased medial longitudinal arch, Bilateral.  Ankle ROM decreased,Bilateral.    Dorsally contracted digits present digits 2, Bilateral.     Vascular: DP pulses 1/4 bilateral.  PT pulses 0/4 bilateral.   CFT <5 seconds, Bilateral.  Hair growth absent to the level of the digits, Bilateral.  Edema present, Bilateral.  Varicosities absent, Bilateral. Erythema absent, Bilateral    Neurological: Sensation diminshed to light touch to level of digits, Bilateral.  Protective sensation intact 6/10 sites via 5.07/10g [place]-Mil Monofilament, Bilateral.  negative Tinel's, Bilateral.  negative Valleix sign, Bilateral.      Integument: Warm, dry, supple, Bilateral.  Open lesion absent, Bilateral.  Interdigital maceration absent to web spaces 4, Bilateral.  Nails 1-5 left and 1-5 right thickened > 3.0 mm, dystrophic and crumbly, discolored with subungual debris. Fissures absent, Bilateral.   General: AAO x 3 in NAD.     Derm  Toenail Description  Sites of Onychomycosis Involvement (Check affected area)  [x] [x] [x] [x] [x] [x] [x] [x] [x] [x]  5 4 3 2 1 1 2 3 4 5                          Right                                        Left    Thickness  [x] [x] [x] [x] [x] [x] [x] [x] [x] [x]  5 4 3 2 1 1 2 3 4 5                         Right                                        Left    Dystrophic Changes   [x] [x] [x] [x] [x] [x] [x] [x] [x] [x]  5 4 3 2 1 1 2 3 4 5                         Right                                        Left    Color [x] [x] [x] [x] [x] [x] [x] [x] [x] [x]  5 4 3 2 1 1 2 3 4 5                          Right                                        Left    Incurvation/Ingrowin   [] [] [] [] [] [] [] [] [] []  5 4 3 2 1 1 2 3 4 5                         Right                                        Left    Inflammation/Pain   [x] [x] [x] [x] [x] [x] [x] [x] [x] [x]  5 4 3 2 1 1 2 3 4 5                         Right                                        Left        Visual inspection:  Deformity: hammertoe deformity federico feet  amputation: absent  Skin lesions: absent  Edema: right- 2+ pitting edema, left- 2+ pitting edema    Sensory exam:  Monofilament sensation: abnormal - 6/10 via SW 5.07/10g monofilament to the plantar foot bilateral feet    Pulses: abnormal - 1/4 dorsalis pedis pulse and 0/4 Posterior tibial pulse,   Pinprick: Impaired  Proprioception: Impaired  Vibration (128 Hz): Impaired       DM with PVD       [x]Yes    []No      Assessment:  82 y.o. female with:   Diagnosis Orders   1. Type II diabetes mellitus with peripheral circulatory disorder ([facility])   DIABETES FOOT EXAM    58571 - RI DEBRIDEMENT OF NAILS, 6 OR MORE   2. Dermatophytosis of nail   DIABETES FOOT EXAM    11716 - RI DEBRIDEMENT OF NAILS, 6 OR MORE   3. PVD (peripheral vascular disease) ([facility])   DIABETES FOOT EXAM    90134 - RI DEBRIDEMENT OF NAILS, 6 OR MORE   4. Pain in both feet   DIABETES FOOT EXAM    12819 - RI DEBRIDEMENT OF NAILS, 6 OR MORE           Q7   []Yes    []No                Q8   [x]Yes    []No                     Q9   []Yes    []No    Plan:   Pt was evaluated and examined. Patient was given personalized discharge instructions. Nails 1-10 were debrided sharply in length and thickness with a nipper and , without incident. Pt will follow up in 9 weeks or sooner if any problems arise. Diagnosis was discussed with the pt and all of their questions were answered in detail. Proper foot hygiene and care was discussed with the pt.  Informed patient on proper diabetic foot care and importance of tight glycemic control. Patient to check feet daily and contact the office with any questions/problems/concerns.    Other comorbidity noted and will be managed by PCP.  12/22/2021    Electronically signed by Michelle Collins DPM on 12/22/2021 at 9:01 AM  12/22/2021

## 2022-01-31 ENCOUNTER — HOSPITAL ENCOUNTER (OUTPATIENT)
Age: 87
Setting detail: SPECIMEN
Discharge: HOME | End: 2022-01-31

## 2022-01-31 LAB
ABSOLUTE EOS #: 0.42 K/UL (ref 0–0.44)
ABSOLUTE IMMATURE GRANULOCYTE: <0.03 K/UL (ref 0–0.3)
ABSOLUTE LYMPH #: 1.32 K/UL (ref 1.1–3.7)
ABSOLUTE MONO #: 0.54 K/UL (ref 0.1–1.2)
ANION GAP SERPL CALCULATED.3IONS-SCNC: 14 MMOL/L (ref 9–17)
BASOPHILS # BLD: 2 % (ref 0–2)
BASOPHILS ABSOLUTE: 0.11 K/UL (ref 0–0.2)
BUN BLDV-MCNC: 27 MG/DL (ref 8–23)
BUN/CREAT BLD: ABNORMAL (ref 9–20)
CALCIUM SERPL-MCNC: 9.3 MG/DL (ref 8.6–10.4)
CHLORIDE BLD-SCNC: 104 MMOL/L (ref 98–107)
CO2: 20 MMOL/L (ref 20–31)
CREAT SERPL-MCNC: 0.76 MG/DL (ref 0.5–0.9)
DIFFERENTIAL TYPE: ABNORMAL
EOSINOPHILS RELATIVE PERCENT: 7 % (ref 1–4)
GFR AFRICAN AMERICAN: >60 ML/MIN
GFR NON-AFRICAN AMERICAN: >60 ML/MIN
GFR SERPL CREATININE-BSD FRML MDRD: ABNORMAL ML/MIN/{1.73_M2}
GFR SERPL CREATININE-BSD FRML MDRD: ABNORMAL ML/MIN/{1.73_M2}
GLUCOSE BLD-MCNC: 167 MG/DL (ref 70–99)
HCT VFR BLD CALC: 43.3 % (ref 36.3–47.1)
HEMOGLOBIN: 14 G/DL (ref 11.9–15.1)
IMMATURE GRANULOCYTES: 0 %
LYMPHOCYTES # BLD: 20 % (ref 24–43)
MCH RBC QN AUTO: 29.8 PG (ref 25.2–33.5)
MCHC RBC AUTO-ENTMCNC: 32.3 G/DL (ref 28.4–34.8)
MCV RBC AUTO: 92.1 FL (ref 82.6–102.9)
MONOCYTES # BLD: 8 % (ref 3–12)
NRBC AUTOMATED: 0 PER 100 WBC
PDW BLD-RTO: 14.7 % (ref 11.8–14.4)
PLATELET # BLD: 315 K/UL (ref 138–453)
PLATELET ESTIMATE: ABNORMAL
PMV BLD AUTO: 11 FL (ref 8.1–13.5)
POTASSIUM SERPL-SCNC: 5.1 MMOL/L (ref 3.7–5.3)
RBC # BLD: 4.7 M/UL (ref 3.95–5.11)
RBC # BLD: ABNORMAL 10*6/UL
SEG NEUTROPHILS: 63 % (ref 36–65)
SEGMENTED NEUTROPHILS ABSOLUTE COUNT: 4.06 K/UL (ref 1.5–8.1)
SODIUM BLD-SCNC: 138 MMOL/L (ref 135–144)
WBC # BLD: 6.5 K/UL (ref 3.5–11.3)
WBC # BLD: ABNORMAL 10*3/UL

## 2022-01-31 PROCEDURE — 36415 COLL VENOUS BLD VENIPUNCTURE: CPT

## 2022-01-31 PROCEDURE — P9603 ONE-WAY ALLOW PRORATED MILES: HCPCS

## 2022-01-31 PROCEDURE — 85025 COMPLETE CBC W/AUTO DIFF WBC: CPT

## 2022-01-31 PROCEDURE — 80048 BASIC METABOLIC PNL TOTAL CA: CPT

## 2022-02-03 ENCOUNTER — HOSPITAL ENCOUNTER (OUTPATIENT)
Age: 87
Setting detail: SPECIMEN
Discharge: HOME | End: 2022-02-03

## 2022-02-03 LAB
ABSOLUTE EOS #: 0.3 K/UL (ref 0–0.44)
ABSOLUTE IMMATURE GRANULOCYTE: 0.03 K/UL (ref 0–0.3)
ABSOLUTE LYMPH #: 1.48 K/UL (ref 1.1–3.7)
ABSOLUTE MONO #: 0.61 K/UL (ref 0.1–1.2)
ANION GAP SERPL CALCULATED.3IONS-SCNC: 13 MMOL/L (ref 9–17)
BASOPHILS # BLD: 2 % (ref 0–2)
BASOPHILS ABSOLUTE: 0.09 K/UL (ref 0–0.2)
BUN BLDV-MCNC: 19 MG/DL (ref 8–23)
BUN/CREAT BLD: ABNORMAL (ref 9–20)
CALCIUM SERPL-MCNC: 9.3 MG/DL (ref 8.6–10.4)
CHLORIDE BLD-SCNC: 100 MMOL/L (ref 98–107)
CO2: 22 MMOL/L (ref 20–31)
CREAT SERPL-MCNC: 0.77 MG/DL (ref 0.5–0.9)
D-DIMER QUANTITATIVE: 0.72 MG/L FEU
DIFFERENTIAL TYPE: ABNORMAL
EOSINOPHILS RELATIVE PERCENT: 5 % (ref 1–4)
FERRITIN: 139 UG/L (ref 13–150)
GFR AFRICAN AMERICAN: >60 ML/MIN
GFR NON-AFRICAN AMERICAN: >60 ML/MIN
GFR SERPL CREATININE-BSD FRML MDRD: ABNORMAL ML/MIN/{1.73_M2}
GFR SERPL CREATININE-BSD FRML MDRD: ABNORMAL ML/MIN/{1.73_M2}
GLUCOSE BLD-MCNC: 184 MG/DL (ref 70–99)
HCT VFR BLD CALC: 42.7 % (ref 36.3–47.1)
HEMOGLOBIN: 13.9 G/DL (ref 11.9–15.1)
IMMATURE GRANULOCYTES: 1 %
LYMPHOCYTES # BLD: 24 % (ref 24–43)
MCH RBC QN AUTO: 29.6 PG (ref 25.2–33.5)
MCHC RBC AUTO-ENTMCNC: 32.6 G/DL (ref 28.4–34.8)
MCV RBC AUTO: 90.9 FL (ref 82.6–102.9)
MONOCYTES # BLD: 10 % (ref 3–12)
NRBC AUTOMATED: 0 PER 100 WBC
PDW BLD-RTO: 14.7 % (ref 11.8–14.4)
PLATELET # BLD: 289 K/UL (ref 138–453)
PLATELET ESTIMATE: ABNORMAL
PMV BLD AUTO: 11.2 FL (ref 8.1–13.5)
POTASSIUM SERPL-SCNC: 4.8 MMOL/L (ref 3.7–5.3)
RBC # BLD: 4.7 M/UL (ref 3.95–5.11)
RBC # BLD: ABNORMAL 10*6/UL
SEG NEUTROPHILS: 58 % (ref 36–65)
SEGMENTED NEUTROPHILS ABSOLUTE COUNT: 3.66 K/UL (ref 1.5–8.1)
SODIUM BLD-SCNC: 135 MMOL/L (ref 135–144)
WBC # BLD: 6.2 K/UL (ref 3.5–11.3)
WBC # BLD: ABNORMAL 10*3/UL

## 2022-02-03 PROCEDURE — 36415 COLL VENOUS BLD VENIPUNCTURE: CPT

## 2022-02-03 PROCEDURE — P9603 ONE-WAY ALLOW PRORATED MILES: HCPCS

## 2022-02-03 PROCEDURE — 85025 COMPLETE CBC W/AUTO DIFF WBC: CPT

## 2022-02-03 PROCEDURE — 80048 BASIC METABOLIC PNL TOTAL CA: CPT

## 2022-02-03 PROCEDURE — 85379 FIBRIN DEGRADATION QUANT: CPT

## 2022-02-03 PROCEDURE — 82728 ASSAY OF FERRITIN: CPT

## 2022-02-07 ENCOUNTER — HOSPITAL ENCOUNTER (OUTPATIENT)
Age: 87
Setting detail: SPECIMEN
Discharge: HOME | End: 2022-02-07

## 2022-02-07 PROCEDURE — 81003 URINALYSIS AUTO W/O SCOPE: CPT

## 2022-02-07 PROCEDURE — 81001 URINALYSIS AUTO W/SCOPE: CPT

## 2022-02-07 PROCEDURE — 87086 URINE CULTURE/COLONY COUNT: CPT

## 2022-02-08 LAB
-: NORMAL
AMORPHOUS: NORMAL
BACTERIA: NORMAL
BILIRUBIN URINE: NEGATIVE
CASTS UA: NORMAL /LPF (ref 0–8)
COLOR: YELLOW
COMMENT UA: ABNORMAL
CRYSTALS, UA: NORMAL /HPF
EPITHELIAL CELLS UA: NORMAL /HPF (ref 0–5)
GLUCOSE URINE: NEGATIVE
KETONES, URINE: NEGATIVE
LEUKOCYTE ESTERASE, URINE: ABNORMAL
MUCUS: NORMAL
NITRITE, URINE: NEGATIVE
OTHER OBSERVATIONS UA: NORMAL
PH UA: 5.5 (ref 5–8)
PROTEIN UA: NEGATIVE
RBC UA: NORMAL /HPF (ref 0–4)
RENAL EPITHELIAL, UA: NORMAL /HPF
SPECIFIC GRAVITY UA: 1.01 (ref 1–1.03)
TRICHOMONAS: NORMAL
TURBIDITY: CLEAR
URINE HGB: NEGATIVE
UROBILINOGEN, URINE: NORMAL
WBC UA: NORMAL /HPF (ref 0–5)
YEAST: NORMAL

## 2022-02-09 LAB
CULTURE: NORMAL
Lab: NORMAL
SPECIMEN DESCRIPTION: NORMAL

## 2022-02-15 ENCOUNTER — OUTSIDE SERVICES (OUTPATIENT)
Dept: PRIMARY CARE CLINIC | Age: 87
End: 2022-02-15

## 2022-02-15 DIAGNOSIS — J12.82 PNEUMONIA DUE TO COVID-19 VIRUS: Primary | ICD-10-CM

## 2022-02-15 DIAGNOSIS — U07.1 PNEUMONIA DUE TO COVID-19 VIRUS: Primary | ICD-10-CM

## 2022-02-15 DIAGNOSIS — I10 PRIMARY HYPERTENSION: ICD-10-CM

## 2022-02-15 DIAGNOSIS — E11.9 TYPE 2 DIABETES MELLITUS WITHOUT COMPLICATION, UNSPECIFIED WHETHER LONG TERM INSULIN USE (HCC): ICD-10-CM

## 2022-02-15 PROBLEM — E78.00 HYPERCHOLESTEROLEMIA: Status: ACTIVE | Noted: 2022-02-15

## 2022-02-15 ASSESSMENT — ENCOUNTER SYMPTOMS
NAUSEA: 0
COUGH: 0
VOMITING: 0
SHORTNESS OF BREATH: 0
DIARRHEA: 0

## 2022-02-15 NOTE — PROGRESS NOTES
COVID-19 virus  Assessment & Plan:   with resp failure- doing better. continue same meds/ therapy  2. Type 2 diabetes mellitus without complication, unspecified whether long term insulin use ([facility] [address]  Assessment & Plan:   monitor for signs of high or low sugars  3.  Primary hypertension  Assessment & Plan:   Well-controlled, continue current medications

## 2022-02-25 ENCOUNTER — OUTSIDE SERVICES (OUTPATIENT)
Dept: PRIMARY CARE CLINIC | Age: 87
End: 2022-02-25

## 2022-02-25 DIAGNOSIS — U09.9 POST COVID-19 CONDITION, UNSPECIFIED: Primary | ICD-10-CM

## 2022-02-25 DIAGNOSIS — K59.00 CONSTIPATION, UNSPECIFIED CONSTIPATION TYPE: ICD-10-CM

## 2022-02-25 DIAGNOSIS — E11.9 TYPE 2 DIABETES MELLITUS WITHOUT COMPLICATION, UNSPECIFIED WHETHER LONG TERM INSULIN USE (HCC): ICD-10-CM

## 2022-02-25 DIAGNOSIS — I10 PRIMARY HYPERTENSION: ICD-10-CM

## 2022-02-26 VITALS — SYSTOLIC BLOOD PRESSURE: 135 MMHG | DIASTOLIC BLOOD PRESSURE: 84 MMHG | HEART RATE: 96 BPM | TEMPERATURE: 98.4 F

## 2022-02-26 ASSESSMENT — ENCOUNTER SYMPTOMS
DIARRHEA: 0
VOMITING: 0
EYE REDNESS: 0
CONSTIPATION: 1
RHINORRHEA: 0
SHORTNESS OF BREATH: 0
EYE DISCHARGE: 0
NAUSEA: 0
WHEEZING: 0
SORE THROAT: 0
COUGH: 0
ABDOMINAL PAIN: 0

## 2022-02-26 NOTE — PROGRESS NOTES
Bud Ulloa is a 82 y.o. female being seen for her weekly follow up. Location of visit: [facility]    Visit Date: 02/25/2022    Reason for Visit:    Chief Complaint   Patient presents with    Post-COVID Symptoms        HPI   Patient is ambulating well and making good progress with therapy. She is happily confused. Argenis Mehtafatmata She will be discharged on Sunday with home health. Blood sugar accucheck has only needed coverage 2x since admission. Blood pressure well controlled  She does relate to some constipation    Review of Systems   Constitutional: Negative for chills and fever. HENT: Negative for rhinorrhea and sore throat. Eyes: Negative for discharge and redness. Respiratory: Negative for cough, shortness of breath and wheezing. Cardiovascular: Negative for chest pain and palpitations. Gastrointestinal: Positive for constipation. Negative for abdominal pain, diarrhea, nausea and vomiting. Genitourinary: Negative for dysuria and frequency. Musculoskeletal: Negative for arthralgias and myalgias. Skin: Negative for rash and wound. Neurological: Negative for dizziness, light-headedness and headaches. Psychiatric/Behavioral: Negative for sleep disturbance. Physical Exam  Vitals and nursing note reviewed. Constitutional:       General: She is not in acute distress. Appearance: She is well-developed. She is not ill-appearing. HENT:      Head: Normocephalic and atraumatic. Right Ear: External ear normal.      Left Ear: External ear normal.   Eyes:      Conjunctiva/sclera: Conjunctivae normal.      Pupils: Pupils are equal, round, and reactive to light. Neck:      Thyroid: No thyromegaly. Trachea: No tracheal deviation. Cardiovascular:      Rate and Rhythm: Normal rate and regular rhythm. Heart sounds: Normal heart sounds. Pulmonary:      Effort: Pulmonary effort is normal. No respiratory distress. Breath sounds: Normal breath sounds. No wheezing. Abdominal:      General: Bowel sounds are normal.      Palpations: Abdomen is soft. Tenderness: There is no abdominal tenderness. Musculoskeletal:      Right lower leg: No edema. Left lower leg: No edema. Lymphadenopathy:      Cervical: No cervical adenopathy. Skin:     General: Skin is warm. Findings: No rash. Neurological:      Mental Status: She is alert. Psychiatric:         Mood and Affect: Mood normal.         Behavior: Behavior normal.         Thought Content: Thought content normal.          Allergies   Allergen Reactions    Adhesive Tape      Takes skin off    Dust Mite Extract     Molds & Smuts     Pollen Extract     Tetanus Toxoid Other (See Comments)        Past Medical History:   Diagnosis Date    Cancer of left breast (HCC)     Chronic back pain     Hyperlipidemia     Hypertension     takes supplement     Hypothyroid     Type II or unspecified type diabetes mellitus without mention of complication, not stated as uncontrolled     Wears glasses     reading    Wears partial dentures     upper right tooth        ASSESSMENT:  /84   Pulse 96   Temp 98.4 °F (36.9 °C)       Diagnosis Orders   1. Post covid-19 condition, unspecified     2. Constipation, unspecified constipation type     3. Primary hypertension          Plan:  Continue miralax 17 gm daily as needed for constipation. Discharge as planned with home health  Follow up with PCP in 1-2 weeks.

## 2022-03-29 ENCOUNTER — HOSPITAL ENCOUNTER (OUTPATIENT)
Age: 87
Setting detail: SPECIMEN
Discharge: HOME | End: 2022-03-29

## 2022-03-30 LAB
CREATININE URINE: 88.6 MG/DL (ref 28–217)
MICROALBUMIN/CREAT 24H UR: <12 MG/L
MICROALBUMIN/CREAT UR-RTO: NORMAL MCG/MG CREAT